# Patient Record
Sex: MALE | Race: WHITE | NOT HISPANIC OR LATINO
[De-identification: names, ages, dates, MRNs, and addresses within clinical notes are randomized per-mention and may not be internally consistent; named-entity substitution may affect disease eponyms.]

---

## 2015-10-09 RX ORDER — INSULIN GLARGINE 100 [IU]/ML
7 INJECTION, SOLUTION SUBCUTANEOUS
Qty: 0 | Refills: 0 | COMMUNITY
Start: 2015-10-09

## 2017-05-26 ENCOUNTER — MEDICATION RENEWAL (OUTPATIENT)
Age: 77
End: 2017-05-26

## 2017-05-26 RX ORDER — FENTANYL 25 UG/H
25 PATCH, EXTENDED RELEASE TRANSDERMAL
Refills: 0 | Status: ACTIVE | COMMUNITY

## 2017-05-26 RX ORDER — LATANOPROST/PF 0.005 %
0.01 DROPS OPHTHALMIC (EYE)
Qty: 1 | Refills: 3 | Status: ACTIVE | COMMUNITY

## 2017-05-26 RX ORDER — INSULIN GLARGINE 100 [IU]/ML
100 INJECTION, SOLUTION SUBCUTANEOUS
Refills: 0 | Status: ACTIVE | COMMUNITY

## 2017-05-26 RX ORDER — GLIMEPIRIDE 4 MG/1
4 TABLET ORAL DAILY
Refills: 0 | Status: ACTIVE | COMMUNITY

## 2017-05-26 RX ORDER — TIOTROPIUM BROMIDE 18 UG/1
18 CAPSULE ORAL; RESPIRATORY (INHALATION) DAILY
Qty: 90 | Refills: 3 | Status: ACTIVE | COMMUNITY

## 2017-05-26 RX ORDER — CARVEDILOL 12.5 MG/1
12.5 TABLET, FILM COATED ORAL TWICE DAILY
Qty: 60 | Refills: 4 | Status: ACTIVE | COMMUNITY

## 2017-05-26 RX ORDER — SPIRONOLACTONE 25 MG/1
25 TABLET, FILM COATED ORAL
Refills: 0 | Status: ACTIVE | COMMUNITY

## 2017-05-26 RX ORDER — FUROSEMIDE 20 MG/1
20 TABLET ORAL DAILY
Qty: 30 | Refills: 3 | Status: ACTIVE | COMMUNITY

## 2017-05-26 RX ORDER — ASPIRIN ENTERIC COATED TABLETS 81 MG 81 MG/1
81 TABLET, DELAYED RELEASE ORAL
Qty: 90 | Refills: 1 | Status: ACTIVE | COMMUNITY

## 2017-05-26 RX ORDER — PRASUGREL HYDROCHLORIDE 10 MG/1
10 TABLET, COATED ORAL
Qty: 90 | Refills: 1 | Status: ACTIVE | COMMUNITY

## 2017-05-26 RX ORDER — ATORVASTATIN CALCIUM 80 MG/1
80 TABLET, FILM COATED ORAL
Qty: 30 | Refills: 2 | Status: ACTIVE | COMMUNITY

## 2017-05-26 RX ORDER — ISOSORBIDE DINITRATE 30 MG/1
30 TABLET ORAL EVERY 6 HOURS
Refills: 0 | Status: ACTIVE | COMMUNITY

## 2017-05-26 RX ORDER — AZITHROMYCIN 500 MG/1
500 TABLET, FILM COATED ORAL
Refills: 0 | Status: ACTIVE | COMMUNITY

## 2017-05-26 RX ORDER — VALSARTAN 80 MG/1
80 TABLET, COATED ORAL
Refills: 0 | Status: ACTIVE | COMMUNITY

## 2017-05-31 ENCOUNTER — APPOINTMENT (OUTPATIENT)
Dept: ELECTROPHYSIOLOGY | Facility: CLINIC | Age: 77
End: 2017-05-31

## 2017-05-31 VITALS
HEIGHT: 71 IN | BODY MASS INDEX: 24.08 KG/M2 | DIASTOLIC BLOOD PRESSURE: 60 MMHG | WEIGHT: 172 LBS | HEART RATE: 58 BPM | SYSTOLIC BLOOD PRESSURE: 120 MMHG

## 2017-10-12 ENCOUNTER — OUTPATIENT (OUTPATIENT)
Dept: OUTPATIENT SERVICES | Facility: HOSPITAL | Age: 77
LOS: 1 days | End: 2017-10-12
Payer: MEDICARE

## 2017-10-12 VITALS
OXYGEN SATURATION: 96 % | TEMPERATURE: 98 F | HEIGHT: 71 IN | SYSTOLIC BLOOD PRESSURE: 157 MMHG | DIASTOLIC BLOOD PRESSURE: 74 MMHG | HEART RATE: 59 BPM | RESPIRATION RATE: 16 BRPM | WEIGHT: 169.98 LBS

## 2017-10-12 VITALS
DIASTOLIC BLOOD PRESSURE: 74 MMHG | HEIGHT: 71 IN | RESPIRATION RATE: 18 BRPM | TEMPERATURE: 98 F | OXYGEN SATURATION: 96 % | HEART RATE: 59 BPM | SYSTOLIC BLOOD PRESSURE: 157 MMHG | WEIGHT: 169.98 LBS

## 2017-10-12 DIAGNOSIS — C44.310 BASAL CELL CARCINOMA OF SKIN OF UNSPECIFIED PARTS OF FACE: Chronic | ICD-10-CM

## 2017-10-12 DIAGNOSIS — Z98.890 OTHER SPECIFIED POSTPROCEDURAL STATES: Chronic | ICD-10-CM

## 2017-10-12 DIAGNOSIS — Z98.89 OTHER SPECIFIED POSTPROCEDURAL STATES: Chronic | ICD-10-CM

## 2017-10-12 DIAGNOSIS — Z01.810 ENCOUNTER FOR PREPROCEDURAL CARDIOVASCULAR EXAMINATION: ICD-10-CM

## 2017-10-12 DIAGNOSIS — Z98.49 CATARACT EXTRACTION STATUS, UNSPECIFIED EYE: Chronic | ICD-10-CM

## 2017-10-12 LAB
ANION GAP SERPL CALC-SCNC: 9 MMOL/L — SIGNIFICANT CHANGE UP (ref 5–17)
BASOPHILS # BLD AUTO: 0 K/UL — SIGNIFICANT CHANGE UP (ref 0–0.2)
BASOPHILS NFR BLD AUTO: 0.1 % — SIGNIFICANT CHANGE UP (ref 0–2)
BLD GP AB SCN SERPL QL: SIGNIFICANT CHANGE UP
BUN SERPL-MCNC: 20 MG/DL — SIGNIFICANT CHANGE UP (ref 8–20)
CALCIUM SERPL-MCNC: 9.2 MG/DL — SIGNIFICANT CHANGE UP (ref 8.6–10.2)
CHLORIDE SERPL-SCNC: 99 MMOL/L — SIGNIFICANT CHANGE UP (ref 98–107)
CO2 SERPL-SCNC: 31 MMOL/L — HIGH (ref 22–29)
CREAT SERPL-MCNC: 0.83 MG/DL — SIGNIFICANT CHANGE UP (ref 0.5–1.3)
EOSINOPHIL # BLD AUTO: 0.2 K/UL — SIGNIFICANT CHANGE UP (ref 0–0.5)
EOSINOPHIL NFR BLD AUTO: 1.9 % — SIGNIFICANT CHANGE UP (ref 0–5)
GLUCOSE SERPL-MCNC: 217 MG/DL — HIGH (ref 70–115)
HCT VFR BLD CALC: 43.9 % — SIGNIFICANT CHANGE UP (ref 42–52)
HGB BLD-MCNC: 15.2 G/DL — SIGNIFICANT CHANGE UP (ref 14–18)
INR BLD: 1.03 RATIO — SIGNIFICANT CHANGE UP (ref 0.88–1.16)
LYMPHOCYTES # BLD AUTO: 1.3 K/UL — SIGNIFICANT CHANGE UP (ref 1–4.8)
LYMPHOCYTES # BLD AUTO: 10.4 % — LOW (ref 20–55)
MAGNESIUM SERPL-MCNC: 1.5 MG/DL — LOW (ref 1.6–2.6)
MCHC RBC-ENTMCNC: 32.1 PG — HIGH (ref 27–31)
MCHC RBC-ENTMCNC: 34.6 G/DL — SIGNIFICANT CHANGE UP (ref 32–36)
MCV RBC AUTO: 92.8 FL — SIGNIFICANT CHANGE UP (ref 80–94)
MONOCYTES # BLD AUTO: 1.2 K/UL — HIGH (ref 0–0.8)
MONOCYTES NFR BLD AUTO: 9.7 % — SIGNIFICANT CHANGE UP (ref 3–10)
NEUTROPHILS # BLD AUTO: 9.8 K/UL — HIGH (ref 1.8–8)
NEUTROPHILS NFR BLD AUTO: 77.5 % — HIGH (ref 37–73)
PLATELET # BLD AUTO: 178 K/UL — SIGNIFICANT CHANGE UP (ref 150–400)
POTASSIUM SERPL-MCNC: 4.1 MMOL/L — SIGNIFICANT CHANGE UP (ref 3.5–5.3)
POTASSIUM SERPL-SCNC: 4.1 MMOL/L — SIGNIFICANT CHANGE UP (ref 3.5–5.3)
PROTHROM AB SERPL-ACNC: 11.3 SEC — SIGNIFICANT CHANGE UP (ref 9.8–12.7)
RBC # BLD: 4.73 M/UL — SIGNIFICANT CHANGE UP (ref 4.6–6.2)
RBC # FLD: 13.5 % — SIGNIFICANT CHANGE UP (ref 11–15.6)
SODIUM SERPL-SCNC: 139 MMOL/L — SIGNIFICANT CHANGE UP (ref 135–145)
TYPE + AB SCN PNL BLD: SIGNIFICANT CHANGE UP
WBC # BLD: 12.6 K/UL — HIGH (ref 4.8–10.8)
WBC # FLD AUTO: 12.6 K/UL — HIGH (ref 4.8–10.8)

## 2017-10-12 PROCEDURE — 85027 COMPLETE CBC AUTOMATED: CPT

## 2017-10-12 PROCEDURE — 86901 BLOOD TYPING SEROLOGIC RH(D): CPT

## 2017-10-12 PROCEDURE — 36415 COLL VENOUS BLD VENIPUNCTURE: CPT

## 2017-10-12 PROCEDURE — 85610 PROTHROMBIN TIME: CPT

## 2017-10-12 PROCEDURE — 83735 ASSAY OF MAGNESIUM: CPT

## 2017-10-12 PROCEDURE — 86850 RBC ANTIBODY SCREEN: CPT

## 2017-10-12 PROCEDURE — 93010 ELECTROCARDIOGRAM REPORT: CPT

## 2017-10-12 PROCEDURE — 86900 BLOOD TYPING SEROLOGIC ABO: CPT

## 2017-10-12 PROCEDURE — 80048 BASIC METABOLIC PNL TOTAL CA: CPT

## 2017-10-12 PROCEDURE — G0463: CPT

## 2017-10-12 PROCEDURE — 93005 ELECTROCARDIOGRAM TRACING: CPT

## 2017-10-12 NOTE — H&P PST ADULT - PSH
Basal cell carcinoma of face  ' 2013:  s/p Excision Basal Cancer Nose with MOHS  Status post angioplasty with stent  PCI x 9, last 11/2015  Traumatic amputation of fingertip  age 12:  Right 5th Digit Basal cell carcinoma of face  ' 2013:  s/p Excision Basal Cancer Nose with MOHS  8/2017 left neck excision with skin graft  S/P carotid endarterectomy  left  S/P cataract surgery  b/l  Status post angioplasty with stent  PCI x 9, last 11/2015  Traumatic amputation of fingertip  age 12:  Right 5th Digit

## 2017-10-12 NOTE — H&P PST ADULT - PMH
Basal cell carcinoma of face  ' 2013:  Nose  CAD (coronary artery disease)  PCI x 9-last 11/2015  Carotid stenosis  Bilateral  s/p left CEA 2013  CHF (congestive heart failure)    Chronic obstructive pulmonary disease    Glaucoma    Hyperlipidemia    Hypertension    Ischemic cardiomyopathy  EF 25-30% (10/2016)  LBBB (left bundle branch block)    Traumatic amputation of fingertip  age 12:  Right 5th Finger  Type 2 diabetes mellitus    Umbilical hernia  assymptomatic Basal cell carcinoma of face  ' 2013:  Nose  CAD (coronary artery disease)  PCI x 9-last 11/2015  Carotid stenosis  Bilateral  s/p left CEA 2013  50-79% of SANDRA  CHF (congestive heart failure)    Chronic obstructive pulmonary disease    Glaucoma    Hyperlipidemia    Hypertension    Ischemic cardiomyopathy  EF 25-30% (10/2016)  LBBB (left bundle branch block)    Traumatic amputation of fingertip  age 12:  Right 5th Finger  Type 2 diabetes mellitus    Umbilical hernia  assymptomatic Basal cell carcinoma of face  ' 2013:  Nose  CAD (coronary artery disease)  PCI x 9-last 11/2015  Carotid stenosis  Bilateral  s/p left CEA 2013  50-79% of SANDRA  CHF (congestive heart failure)    Chronic obstructive pulmonary disease    Glaucoma    Hyperlipidemia    Hypertension    Ischemic cardiomyopathy  EF 25-30% (10/2016)  LBBB (left bundle branch block)    Shingles (herpes zoster) polyneuropathy  left axilla  Traumatic amputation of fingertip  age 12:  Right 5th Finger  Type 2 diabetes mellitus    Umbilical hernia  assymptomatic

## 2017-10-12 NOTE — H&P PST ADULT - HISTORY OF PRESENT ILLNESS
76 yo male with pmhx COPD, DM, carotid dz s/p left CEA 2013, HTN, CVA s/p TPA 2015, CAD s/p multiple PCIs, cardiac arrest in setting of pLAD PCI 9/2015, ICM EF 25-30% >90 days, LBBB, class II- III CHF who presents for PST for elective BIV ICD implant.    EST 9/14/17: 5 METS, duration of exercise 3min 34sec, non-diagnostic for exercise induced ischemia due to LBBB, rare PVC, negative for exercise induced angina, poor cardiovascular fitness level  Cath 11/2015: pLCX 90%, OM1 95%, OM2 80%, s/p PCI pLCX and OM1  TTE 10/2016: EF 25-30%, severe segmental LV systolic dysfunction, inflat, inf and apical wall akinesis 78 yo male with pmhx COPD, DM, carotid dz s/p left CEA 2013, HTN, CVA s/p TPA 2015, CAD s/p multiple PCIs, cardiac arrest in setting of pLAD PCI 9/2015, ICM EF 25-30% >90 days, LBBB, class II- III CHF who presents for PST for elective BIV ICD implant.    EST 9/14/17: 5 METS, duration of exercise 3min 34sec, non-diagnostic for exercise induced ischemia due to LBBB, rare PVC, negative for exercise induced angina, poor cardiovascular fitness level  Cath 11/2015-[facility]:  pLCX 90%, OM1 95%, OM2 80%, s/p PCI pLCX and OM1  TTE 10/2016: EF 25-30%, severe segmental LV systolic dysfunction, inflat, inf and apical wall akinesis

## 2017-10-12 NOTE — H&P PST ADULT - ASSESSMENT
76 yo male with ICM EF 25-30% (10/2016) despite guideline directed medical therapy for over 1 year, LBBB with QRS >150msec, Class II-III CHF, referred for elective primary prevention BIV ICD.      -hold effient 3 days prior to procedure  -hold DM medication 78 yo male with ICM EF 25-30% (10/2016) despite guideline directed medical therapy for over 1 year, LBBB with QRS >150msec, Class II-III CHF, referred for elective primary prevention BIV ICD.      -hold effient 3 days prior to procedure, last dose 10/13  -hold DM medication glimepiride morning of procedure.  -take 1/2 insulin dose night before procedure -take 7 units 10/16/17 78 yo male with ICM EF 25-30% (10/2016) despite guideline directed medical therapy for over 1 year, LBBB with QRS >150msec, Class II-III CHF, referred for elective primary prevention BIV ICD.      -hold effient 3 days prior to procedure, last dose 10/13  -hold DM medication glimepiride morning of procedure.  -take 1/2 insulin dose night before procedure -take 7 units 10/16/17  -magnesium 1.5 today, left message on home phone (Ok to do per pt) [phone] to start OTC mag Oxide 400mg po bid

## 2017-10-12 NOTE — ASU PATIENT PROFILE, ADULT - PSH
Basal cell carcinoma of face  ' 2013:  s/p Excision Basal Cancer Nose with MOHS  Status post angioplasty with stent  PCI x 9, last 11/2015  Traumatic amputation of fingertip  age 12:  Right 5th Digit

## 2017-10-17 ENCOUNTER — INPATIENT (INPATIENT)
Facility: HOSPITAL | Age: 77
LOS: 0 days | Discharge: HOME | DRG: 227 | End: 2017-10-18
Attending: STUDENT IN AN ORGANIZED HEALTH CARE EDUCATION/TRAINING PROGRAM | Admitting: STUDENT IN AN ORGANIZED HEALTH CARE EDUCATION/TRAINING PROGRAM
Payer: MEDICARE

## 2017-10-17 ENCOUNTER — TRANSCRIPTION ENCOUNTER (OUTPATIENT)
Age: 77
End: 2017-10-17

## 2017-10-17 VITALS
DIASTOLIC BLOOD PRESSURE: 72 MMHG | RESPIRATION RATE: 16 BRPM | SYSTOLIC BLOOD PRESSURE: 154 MMHG | OXYGEN SATURATION: 95 % | TEMPERATURE: 98 F | HEART RATE: 69 BPM

## 2017-10-17 DIAGNOSIS — Z98.890 OTHER SPECIFIED POSTPROCEDURAL STATES: Chronic | ICD-10-CM

## 2017-10-17 DIAGNOSIS — Z98.89 OTHER SPECIFIED POSTPROCEDURAL STATES: Chronic | ICD-10-CM

## 2017-10-17 DIAGNOSIS — Z98.49 CATARACT EXTRACTION STATUS, UNSPECIFIED EYE: Chronic | ICD-10-CM

## 2017-10-17 DIAGNOSIS — C44.310 BASAL CELL CARCINOMA OF SKIN OF UNSPECIFIED PARTS OF FACE: Chronic | ICD-10-CM

## 2017-10-17 DIAGNOSIS — I42.9 CARDIOMYOPATHY, UNSPECIFIED: ICD-10-CM

## 2017-10-17 DIAGNOSIS — Z01.810 ENCOUNTER FOR PREPROCEDURAL CARDIOVASCULAR EXAMINATION: ICD-10-CM

## 2017-10-17 LAB
BLD GP AB SCN SERPL QL: SIGNIFICANT CHANGE UP
GLUCOSE BLDC GLUCOMTR-MCNC: 329 MG/DL — HIGH (ref 70–99)
GLUCOSE BLDC GLUCOMTR-MCNC: 361 MG/DL — HIGH (ref 70–99)
TYPE + AB SCN PNL BLD: SIGNIFICANT CHANGE UP

## 2017-10-17 PROCEDURE — 71010: CPT | Mod: 26

## 2017-10-17 PROCEDURE — 33249 INSJ/RPLCMT DEFIB W/LEAD(S): CPT | Mod: Q0

## 2017-10-17 PROCEDURE — 33225 L VENTRIC PACING LEAD ADD-ON: CPT

## 2017-10-17 RX ORDER — DEXTROSE 50 % IN WATER 50 %
12.5 SYRINGE (ML) INTRAVENOUS ONCE
Qty: 0 | Refills: 0 | Status: DISCONTINUED | OUTPATIENT
Start: 2017-10-17 | End: 2017-10-18

## 2017-10-17 RX ORDER — ISOSORBIDE MONONITRATE 60 MG/1
15 TABLET, EXTENDED RELEASE ORAL DAILY
Qty: 0 | Refills: 0 | Status: DISCONTINUED | OUTPATIENT
Start: 2017-10-17 | End: 2017-10-18

## 2017-10-17 RX ORDER — CARVEDILOL PHOSPHATE 80 MG/1
12.5 CAPSULE, EXTENDED RELEASE ORAL EVERY 12 HOURS
Qty: 0 | Refills: 0 | Status: DISCONTINUED | OUTPATIENT
Start: 2017-10-17 | End: 2017-10-18

## 2017-10-17 RX ORDER — CEFAZOLIN SODIUM 1 G
2000 VIAL (EA) INJECTION EVERY 8 HOURS
Qty: 0 | Refills: 0 | Status: COMPLETED | OUTPATIENT
Start: 2017-10-17 | End: 2017-10-18

## 2017-10-17 RX ORDER — TIOTROPIUM BROMIDE 18 UG/1
1 CAPSULE ORAL; RESPIRATORY (INHALATION) DAILY
Qty: 0 | Refills: 0 | Status: DISCONTINUED | OUTPATIENT
Start: 2017-10-17 | End: 2017-10-18

## 2017-10-17 RX ORDER — DEXTROSE 50 % IN WATER 50 %
25 SYRINGE (ML) INTRAVENOUS ONCE
Qty: 0 | Refills: 0 | Status: DISCONTINUED | OUTPATIENT
Start: 2017-10-17 | End: 2017-10-18

## 2017-10-17 RX ORDER — SACUBITRIL AND VALSARTAN 24; 26 MG/1; MG/1
1 TABLET, FILM COATED ORAL
Qty: 0 | Refills: 0 | Status: DISCONTINUED | OUTPATIENT
Start: 2017-10-17 | End: 2017-10-18

## 2017-10-17 RX ORDER — OXYCODONE HYDROCHLORIDE 5 MG/1
5 TABLET ORAL EVERY 4 HOURS
Qty: 0 | Refills: 0 | Status: DISCONTINUED | OUTPATIENT
Start: 2017-10-17 | End: 2017-10-18

## 2017-10-17 RX ORDER — ATORVASTATIN CALCIUM 80 MG/1
80 TABLET, FILM COATED ORAL AT BEDTIME
Qty: 0 | Refills: 0 | Status: DISCONTINUED | OUTPATIENT
Start: 2017-10-17 | End: 2017-10-18

## 2017-10-17 RX ORDER — SODIUM CHLORIDE 9 MG/ML
1000 INJECTION, SOLUTION INTRAVENOUS
Qty: 0 | Refills: 0 | Status: DISCONTINUED | OUTPATIENT
Start: 2017-10-17 | End: 2017-10-18

## 2017-10-17 RX ORDER — FUROSEMIDE 40 MG
20 TABLET ORAL DAILY
Qty: 0 | Refills: 0 | Status: DISCONTINUED | OUTPATIENT
Start: 2017-10-17 | End: 2017-10-18

## 2017-10-17 RX ORDER — SPIRONOLACTONE 25 MG/1
25 TABLET, FILM COATED ORAL DAILY
Qty: 0 | Refills: 0 | Status: DISCONTINUED | OUTPATIENT
Start: 2017-10-17 | End: 2017-10-18

## 2017-10-17 RX ORDER — ASPIRIN/CALCIUM CARB/MAGNESIUM 324 MG
81 TABLET ORAL DAILY
Qty: 0 | Refills: 0 | Status: DISCONTINUED | OUTPATIENT
Start: 2017-10-17 | End: 2017-10-18

## 2017-10-17 RX ORDER — INSULIN GLARGINE 100 [IU]/ML
15 INJECTION, SOLUTION SUBCUTANEOUS AT BEDTIME
Qty: 0 | Refills: 0 | Status: DISCONTINUED | OUTPATIENT
Start: 2017-10-17 | End: 2017-10-18

## 2017-10-17 RX ORDER — ACETAMINOPHEN 500 MG
650 TABLET ORAL EVERY 6 HOURS
Qty: 0 | Refills: 0 | Status: DISCONTINUED | OUTPATIENT
Start: 2017-10-17 | End: 2017-10-18

## 2017-10-17 RX ORDER — DEXTROSE 50 % IN WATER 50 %
1 SYRINGE (ML) INTRAVENOUS ONCE
Qty: 0 | Refills: 0 | Status: DISCONTINUED | OUTPATIENT
Start: 2017-10-17 | End: 2017-10-18

## 2017-10-17 RX ORDER — ONDANSETRON 8 MG/1
4 TABLET, FILM COATED ORAL EVERY 6 HOURS
Qty: 0 | Refills: 0 | Status: DISCONTINUED | OUTPATIENT
Start: 2017-10-17 | End: 2017-10-18

## 2017-10-17 RX ORDER — INSULIN LISPRO 100/ML
VIAL (ML) SUBCUTANEOUS
Qty: 0 | Refills: 0 | Status: DISCONTINUED | OUTPATIENT
Start: 2017-10-17 | End: 2017-10-18

## 2017-10-17 RX ORDER — GLUCAGON INJECTION, SOLUTION 0.5 MG/.1ML
1 INJECTION, SOLUTION SUBCUTANEOUS ONCE
Qty: 0 | Refills: 0 | Status: DISCONTINUED | OUTPATIENT
Start: 2017-10-17 | End: 2017-10-18

## 2017-10-17 RX ORDER — ALPRAZOLAM 0.25 MG
0.25 TABLET ORAL EVERY 6 HOURS
Qty: 0 | Refills: 0 | Status: DISCONTINUED | OUTPATIENT
Start: 2017-10-17 | End: 2017-10-18

## 2017-10-17 RX ORDER — LATANOPROST 0.05 MG/ML
1 SOLUTION/ DROPS OPHTHALMIC; TOPICAL AT BEDTIME
Qty: 0 | Refills: 0 | Status: DISCONTINUED | OUTPATIENT
Start: 2017-10-17 | End: 2017-10-18

## 2017-10-17 RX ADMIN — OXYCODONE HYDROCHLORIDE 5 MILLIGRAM(S): 5 TABLET ORAL at 17:55

## 2017-10-17 RX ADMIN — OXYCODONE HYDROCHLORIDE 5 MILLIGRAM(S): 5 TABLET ORAL at 23:30

## 2017-10-17 RX ADMIN — Medication 100 MILLIGRAM(S): at 16:32

## 2017-10-17 RX ADMIN — LATANOPROST 1 DROP(S): 0.05 SOLUTION/ DROPS OPHTHALMIC; TOPICAL at 22:35

## 2017-10-17 RX ADMIN — INSULIN GLARGINE 15 UNIT(S): 100 INJECTION, SOLUTION SUBCUTANEOUS at 22:35

## 2017-10-17 RX ADMIN — Medication 81 MILLIGRAM(S): at 16:32

## 2017-10-17 RX ADMIN — Medication 650 MILLIGRAM(S): at 16:32

## 2017-10-17 RX ADMIN — OXYCODONE HYDROCHLORIDE 5 MILLIGRAM(S): 5 TABLET ORAL at 18:18

## 2017-10-17 RX ADMIN — OXYCODONE HYDROCHLORIDE 5 MILLIGRAM(S): 5 TABLET ORAL at 22:50

## 2017-10-17 RX ADMIN — Medication 650 MILLIGRAM(S): at 17:49

## 2017-10-17 RX ADMIN — CARVEDILOL PHOSPHATE 12.5 MILLIGRAM(S): 80 CAPSULE, EXTENDED RELEASE ORAL at 17:55

## 2017-10-17 RX ADMIN — Medication 5: at 16:29

## 2017-10-17 RX ADMIN — SACUBITRIL AND VALSARTAN 1 TABLET(S): 24; 26 TABLET, FILM COATED ORAL at 17:55

## 2017-10-17 RX ADMIN — ATORVASTATIN CALCIUM 80 MILLIGRAM(S): 80 TABLET, FILM COATED ORAL at 22:35

## 2017-10-17 NOTE — PROGRESS NOTE ADULT - SUBJECTIVE AND OBJECTIVE BOX
PROCEDURE(S): [employer] BiV ICD Implant    ELECTRPHYSIOLOGIST(S): Dell Cooper MD    COMPLICATIONS:  none          DISPOSITION:  Observation Unit           CONDITION: Stable      Pt doing well s/p MDT BiV ICD. Denies complaint    Exam:   VSS, NAD, A&O x 3  Incision: Dressing C/D/I; no bleeding, hematoma, erythema or edema  Card: S1/S2, RRR, no m/g/r  Resp: lungs CTA b/l  Abd: S/NT/ND  Ext: no edema, distal pulses intact    Assessment:   76 yo male with ICM EF 25-30% (10/2016) with Class II-III chronic systolic HFrEF despite guideline directed medical therapy for over 1 year and LBBB with QRS >150msec, now status post uncomplicated MDT BiV ICD implant.     Plan:   Port CXR now - r/o PTX or effusion, verify lead locations.   Bedrest x 4 hours post implant, then OOB w/ assist & progress as tolerated.    Continued observation on telemetry overnight.   Cont Ancef 2gm IV q 8 hours x 2 additional doses to complete 24 hour course.   Pain control with PO analgesia PRN.   NO HEPARIN OR LOVENOX, INCLUDING PROPHYLACTIC/SUBCUT DOSING, UNTIL OTHERWISE ADVISED BY EP.   Effient to resume in 2 days.   Resume other home medications.   PA/Lat CXR and device check in AM.   Pending status overnight, anticipate d/c home tomorrow with outpt f/up in 1-2 weeks.

## 2017-10-17 NOTE — DISCHARGE NOTE ADULT - HOSPITAL COURSE
78 yo male with ICM EF 25-30% (10/2016) with Class II-III chronic systolic HFrEF despite guideline directed medical therapy for over 1 year and LBBB with QRS >150msec. He presented electively 10/17/17 and underwent uncomplicated MDT BiV ICD implant for primary prevention of sudden cardiac death and cardiac resynchronization therapy. 78 yo male with ICM EF 25-30% (10/2016) with Class II-III chronic systolic HFrEF despite guideline directed medical therapy for over 1 year and LBBB with QRS >150msec. He presented electively 10/17/17 and underwent uncomplicated MDT BiV ICD implant for primary prevention of sudden cardiac death and cardiac resynchronization therapy. The patient was observed overnight without event and was discharged home the following morning with a plan for outpatient follow up.

## 2017-10-17 NOTE — DISCHARGE NOTE ADULT - PROVIDER TOKENS
FREE:[LAST:[Allison],FIRST:[Dell],PHONE:[phone]],FAX:[(   [phone]    -],ADDRESS:[[place] [address]]]

## 2017-10-17 NOTE — DISCHARGE NOTE ADULT - ADDITIONAL INSTRUCTIONS
Follow up with Dr. Cooper at [facility] in 2 weeks. Please call [phone] to schedule an appointment.

## 2017-10-17 NOTE — DISCHARGE NOTE ADULT - MEDICATION SUMMARY - MEDICATIONS TO TAKE
I will START or STAY ON the medications listed below when I get home from the hospital:    spironolactone 25 mg oral tablet  -- 1 tab(s) by mouth once a day  -- Indication: For Cardiac health    aspirin 81 mg oral delayed release tablet  -- 1 tab(s) by mouth once a day  -- Indication: For blood clot ("thrombus") prevention    acetaminophen 325 mg oral tablet  -- 2 tab(s) by mouth every 6 hours, As needed, Mild Pain (1 - 3)  -- Indication: For post procedure pain - as needed    Entresto 49 mg-51 mg oral tablet  -- 1 tab(s) by mouth 2 times a day  -- Indication: For Chronic systolic congestive heart failure    Imdur 30 mg oral tablet, extended release  -- 0.5 tab(s) by mouth once a day  -- Indication: For Cardiac health    glimepiride 4 mg oral tablet  -- 1 tab(s) by mouth once a day  -- Indication: For diabetes    insulin glargine  --   15 units qhs daily  -- Indication: For diabetes    Lipitor 80 mg oral tablet  -- 1 tab(s) by mouth once a day (at bedtime)  -- Indication: For Cholesterol management    Effient 10 mg oral tablet  -- 1 tab(s) by mouth once a day  -- Indication: For RESUME 10/20 AM    carvedilol 12.5 mg oral tablet  -- 1 tab(s) by mouth every 12 hours  -- Indication: For Cardiac health    Spiriva 18 mcg inhalation capsule  -- 1 each inhaled once a day  -- Indication: For COPD    furosemide 20 mg oral tablet  -- 1 tab(s) by mouth once a day  -- Indication: For Cardiac health    latanoprost 0.005% ophthalmic solution  --  to each affected eye   -- Indication: For glaucoma I will START or STAY ON the medications listed below when I get home from the hospital:    spironolactone 25 mg oral tablet  -- 1 tab(s) by mouth once a day  -- Indication: For Cardiac health    aspirin 81 mg oral delayed release tablet  -- 1 tab(s) by mouth once a day  -- Indication: For blood clot ("thrombus") prevention    acetaminophen 325 mg oral tablet  -- 2 tab(s) by mouth every 6 hours, As needed, Mild Pain (1 - 3)  -- Indication: For post procedure pain - as needed    Entresto 49 mg-51 mg oral tablet  -- 1 tab(s) by mouth 2 times a day  -- Indication: For Chronic systolic congestive heart failure    Imdur 30 mg oral tablet, extended release  -- 0.5 tab(s) by mouth once a day  -- Indication: For Cardiac health    glimepiride 4 mg oral tablet  -- 1 tab(s) by mouth once a day  -- Indication: For diabetes    insulin glargine  --   15 units qhs daily  -- Indication: For diabetes    Lipitor 80 mg oral tablet  -- 1 tab(s) by mouth once a day (at bedtime)  -- Indication: For Cholesterol management    Effient 10 mg oral tablet  -- 1 tab(s) by mouth once a day  -- Indication: For RESUME 10/20 AM    carvedilol 12.5 mg oral tablet  -- 1 tab(s) by mouth every 12 hours  -- Indication: For Cardiac health    Spiriva 18 mcg inhalation capsule  -- 1 each inhaled once a day  -- Indication: For COPD    furosemide 20 mg oral tablet  -- 1 tab(s) by mouth once a day  -- Indication: For Cardiac health    magnesium oxide 400 mg (240 mg elemental magnesium) oral tablet  -- 1 tab(s) by mouth once a day  -- Indication: For supplement    latanoprost 0.005% ophthalmic solution  --  to each affected eye   -- Indication: For glaucoma

## 2017-10-17 NOTE — DISCHARGE NOTE ADULT - CARE PLAN
Principal Discharge DX:	Chronic systolic congestive heart failure  Goal:	optimize cardiac health  Instructions for follow-up, activity and diet:	Cardiac Device Implant Post Operative Instructions  - Bruising around the implant site or over the chest, side or arm near the incision is normal, and will take a few weeks to resolve.  -Do not lift the affected arm higher than 90 degrees (shoulder height) in any direction for 4 weeks.   - Do not push, pull or lift anything heavier than 10 lbs (about a gallon of milk) with the affected arm for 4 weeks.     - Do not touch the incision until it is completely healed.   - There are Steristrips (white strips of tape) on your incision, which will start to peal off on their own over the next 2-3 weeks. Do not pick at or peal off the Steristrips.   - Do not apply soaps, creams, lotions, ointments or powders to the incision until it is completely healed.  You should call the doctor if:   - you notice redness, drainage, swelling, increased tenderness, hot sensation around the  incision, bleeding or incision edges pulling apart.  - your temperature is greater than 100 degress F for more than 24 hours.  - you notice swelling or bulging at the incision or around the device that was not there when you left the hospital or is increasing in size.  -you experience increased difficulty breathing.  - you notice new/worsening swelling in your legs and ankles.  - you faint or have dizzy spells.  -you have any questions or concerns regarding your device or the procedure.

## 2017-10-17 NOTE — DISCHARGE NOTE ADULT - INSTRUCTIONS
Choose lean meats and poultry without skin and prepare them without added saturated and trans fat.  Eat fish at least twice a week. Recent research shows that eating oily fish containing omega-3 fatty acids (for example, salmon, trout and herring) may help lower your risk of death from coronary artery disease.  Select fat-free, 1 percent fat and low-fat dairy products.  Cut back on foods containing partially hydrogenated vegetable oils to reduce trans fat in your diet.   To lower cholesterol, reduce saturated fat to no more than 5 to 6 percent of total calories. For someone eating 2,000 calories a day, that’s about 13 grams of saturated fat.  Cut back on beverages and foods with added sugars.  Choose and prepare foods with little or no salt. To lower blood pressure, aim to eat no more than 2,400 milligrams of sodium per day. Reducing daily intake to 1,500 mg is desirable because it can lower blood pressure even further.  If you drink alcohol, drink in moderation. That means one drink per day if you’re a woman and two drinks  per day if you’re a man.  Follow the American Heart Association recommendations when you eat out, and keep an eye on your portion sizes.  With a diagnosis of diabetes comes a strict diet regimen to help control blood sugars by watching carbohydrate consumption. Carbohydrates, such as starches, fruits, dairy and starchy vegetables, is the food group that affects glucose levels in the body. Carefully monitoring carbohydrate intake is a main component of the diabetic diet; eating three meals each day that are roughly equivalent in size can help control blood sugars. A registered dietitian can help you plan a diet customized to your individual needs.

## 2017-10-17 NOTE — DISCHARGE NOTE ADULT - PATIENT PORTAL LINK FT
“You can access the [facility] Patient Portal, offered by [facility], by registering with the following website: [email or web address]”

## 2017-10-18 VITALS
DIASTOLIC BLOOD PRESSURE: 84 MMHG | RESPIRATION RATE: 16 BRPM | OXYGEN SATURATION: 93 % | HEART RATE: 60 BPM | SYSTOLIC BLOOD PRESSURE: 150 MMHG | TEMPERATURE: 98 F

## 2017-10-18 LAB
ANION GAP SERPL CALC-SCNC: 10 MMOL/L — SIGNIFICANT CHANGE UP (ref 5–17)
BUN SERPL-MCNC: 24 MG/DL — HIGH (ref 8–20)
CALCIUM SERPL-MCNC: 8.4 MG/DL — LOW (ref 8.6–10.2)
CHLORIDE SERPL-SCNC: 97 MMOL/L — LOW (ref 98–107)
CO2 SERPL-SCNC: 30 MMOL/L — HIGH (ref 22–29)
CREAT SERPL-MCNC: 0.9 MG/DL — SIGNIFICANT CHANGE UP (ref 0.5–1.3)
GLUCOSE SERPL-MCNC: 261 MG/DL — HIGH (ref 70–115)
HCT VFR BLD CALC: 40.2 % — LOW (ref 42–52)
HGB BLD-MCNC: 13.8 G/DL — LOW (ref 14–18)
MAGNESIUM SERPL-MCNC: 1.9 MG/DL — SIGNIFICANT CHANGE UP (ref 1.6–2.6)
MCHC RBC-ENTMCNC: 31.9 PG — HIGH (ref 27–31)
MCHC RBC-ENTMCNC: 34.3 G/DL — SIGNIFICANT CHANGE UP (ref 32–36)
MCV RBC AUTO: 93.1 FL — SIGNIFICANT CHANGE UP (ref 80–94)
PLATELET # BLD AUTO: 136 K/UL — LOW (ref 150–400)
POTASSIUM SERPL-MCNC: 4.5 MMOL/L — SIGNIFICANT CHANGE UP (ref 3.5–5.3)
POTASSIUM SERPL-SCNC: 4.5 MMOL/L — SIGNIFICANT CHANGE UP (ref 3.5–5.3)
RBC # BLD: 4.32 M/UL — LOW (ref 4.6–6.2)
RBC # FLD: 13.4 % — SIGNIFICANT CHANGE UP (ref 11–15.6)
SODIUM SERPL-SCNC: 137 MMOL/L — SIGNIFICANT CHANGE UP (ref 135–145)
WBC # BLD: 14 K/UL — HIGH (ref 4.8–10.8)
WBC # FLD AUTO: 14 K/UL — HIGH (ref 4.8–10.8)

## 2017-10-18 PROCEDURE — C1887: CPT

## 2017-10-18 PROCEDURE — 93005 ELECTROCARDIOGRAM TRACING: CPT

## 2017-10-18 PROCEDURE — C1777: CPT

## 2017-10-18 PROCEDURE — C1900: CPT

## 2017-10-18 PROCEDURE — 85027 COMPLETE CBC AUTOMATED: CPT

## 2017-10-18 PROCEDURE — C1769: CPT

## 2017-10-18 PROCEDURE — C1898: CPT

## 2017-10-18 PROCEDURE — 71020: CPT | Mod: 26

## 2017-10-18 PROCEDURE — 71045 X-RAY EXAM CHEST 1 VIEW: CPT

## 2017-10-18 PROCEDURE — 83735 ASSAY OF MAGNESIUM: CPT

## 2017-10-18 PROCEDURE — 71046 X-RAY EXAM CHEST 2 VIEWS: CPT

## 2017-10-18 PROCEDURE — 93010 ELECTROCARDIOGRAM REPORT: CPT

## 2017-10-18 PROCEDURE — 82962 GLUCOSE BLOOD TEST: CPT

## 2017-10-18 PROCEDURE — 86850 RBC ANTIBODY SCREEN: CPT

## 2017-10-18 PROCEDURE — C1882: CPT

## 2017-10-18 PROCEDURE — 36415 COLL VENOUS BLD VENIPUNCTURE: CPT

## 2017-10-18 PROCEDURE — 86901 BLOOD TYPING SEROLOGIC RH(D): CPT

## 2017-10-18 PROCEDURE — C1730: CPT

## 2017-10-18 PROCEDURE — 80048 BASIC METABOLIC PNL TOTAL CA: CPT

## 2017-10-18 PROCEDURE — 86900 BLOOD TYPING SEROLOGIC ABO: CPT

## 2017-10-18 RX ORDER — MAGNESIUM OXIDE 400 MG ORAL TABLET 241.3 MG
1 TABLET ORAL
Qty: 0 | Refills: 0 | COMMUNITY
Start: 2017-10-18

## 2017-10-18 RX ORDER — ACETAMINOPHEN 500 MG
2 TABLET ORAL
Qty: 0 | Refills: 0 | COMMUNITY
Start: 2017-10-18

## 2017-10-18 RX ORDER — MAGNESIUM OXIDE 400 MG ORAL TABLET 241.3 MG
400 TABLET ORAL ONCE
Qty: 0 | Refills: 0 | Status: COMPLETED | OUTPATIENT
Start: 2017-10-18 | End: 2017-10-18

## 2017-10-18 RX ADMIN — SACUBITRIL AND VALSARTAN 1 TABLET(S): 24; 26 TABLET, FILM COATED ORAL at 06:11

## 2017-10-18 RX ADMIN — Medication 20 MILLIGRAM(S): at 06:10

## 2017-10-18 RX ADMIN — Medication 100 MILLIGRAM(S): at 00:03

## 2017-10-18 RX ADMIN — CARVEDILOL PHOSPHATE 12.5 MILLIGRAM(S): 80 CAPSULE, EXTENDED RELEASE ORAL at 06:10

## 2017-10-18 RX ADMIN — MAGNESIUM OXIDE 400 MG ORAL TABLET 400 MILLIGRAM(S): 241.3 TABLET ORAL at 09:27

## 2017-10-18 NOTE — PROGRESS NOTE ADULT - SUBJECTIVE AND OBJECTIVE BOX
Pt doing well POD #1 s/p BiV ICD implant. Stable overnight w/o event. Denies complaint.     Exam:   VSS, NAD, A&O x 3  Incision: Steri strips small amt of dried heme, but otherwise C/D/I; no bleeding, hematoma, erythema, exudate or edema; distal pulses intact  Card: S1/S2, RRR, no m/g/r  Resp: lungs CTA b/l  Abd: S/NT/ND  Ext: no edema, distal pulses intact    Device interrogation: measurements appropriate & stable. Parameters confirmed (DDD , Adaptive CRT, VF >188). No diaphragmatic stim.     Tele: intermittent atrial paced w/ BiV pacing (adaptive); no sustained arrhythmias or acute changes.     CXR: lead locations grossly stable & appropriate. Official read pending.     Labs:                         13.8   14.0  )-----------( 136      ( 18 Oct 2017 05:43 )             40.2   10-18    137  |  97<L>  |  24.0<H>  ----------------------------<  261<H>  4.5   |  30.0<H>  |  0.90    Ca    8.4<L>      18 Oct 2017 05:43  Mg     1.9     10-18      Assessment:   78 yo male with ICM EF 25-30% (10/2016) with Class II-III chronic systolic HFrEF despite guideline directed medical therapy for over 1 year and LBBB with QRS >150msec, now POD #1 status post uncomplicated MDT BiV ICD implant.       Plan:   Pt instructed as to ROM of affected arm - no lifting/pushing/pulling >10 lbs & shoulder ROM limited to 90deg & below x 4 weeks.   Pt instructed as to incision care and f/up - written instructions included in d/c documents.  Outpt f/up in 2 weeks - pt will call to schedule an appointment.    Effient to resume 10/20/17  Ok to d/c home.

## 2017-10-27 PROBLEM — I50.9 HEART FAILURE, UNSPECIFIED: Chronic | Status: ACTIVE | Noted: 2017-10-12

## 2017-10-27 PROBLEM — I44.7 LEFT BUNDLE-BRANCH BLOCK, UNSPECIFIED: Chronic | Status: ACTIVE | Noted: 2017-10-12

## 2017-10-27 PROBLEM — I25.5 ISCHEMIC CARDIOMYOPATHY: Chronic | Status: ACTIVE | Noted: 2017-10-12

## 2017-11-01 ENCOUNTER — APPOINTMENT (OUTPATIENT)
Dept: ELECTROPHYSIOLOGY | Facility: CLINIC | Age: 77
End: 2017-11-01
Payer: MEDICARE

## 2017-11-01 VITALS
HEART RATE: 60 BPM | SYSTOLIC BLOOD PRESSURE: 160 MMHG | DIASTOLIC BLOOD PRESSURE: 72 MMHG | WEIGHT: 166 LBS | BODY MASS INDEX: 23.15 KG/M2

## 2017-11-01 PROCEDURE — 93284 PRGRMG EVAL IMPLANTABLE DFB: CPT

## 2017-11-01 PROCEDURE — 93000 ELECTROCARDIOGRAM COMPLETE: CPT | Mod: 59

## 2017-11-01 PROCEDURE — 99024 POSTOP FOLLOW-UP VISIT: CPT

## 2018-06-01 ENCOUNTER — RX RENEWAL (OUTPATIENT)
Age: 78
End: 2018-06-01

## 2018-06-04 ENCOUNTER — RX RENEWAL (OUTPATIENT)
Age: 78
End: 2018-06-04

## 2018-06-10 ENCOUNTER — INPATIENT (INPATIENT)
Facility: HOSPITAL | Age: 78
LOS: 3 days | Discharge: HOME | DRG: 280 | End: 2018-06-14
Attending: HOSPITALIST | Admitting: INTERNAL MEDICINE
Payer: MEDICARE

## 2018-06-10 VITALS
HEART RATE: 70 BPM | OXYGEN SATURATION: 97 % | RESPIRATION RATE: 24 BRPM | DIASTOLIC BLOOD PRESSURE: 65 MMHG | SYSTOLIC BLOOD PRESSURE: 131 MMHG

## 2018-06-10 DIAGNOSIS — C44.310 BASAL CELL CARCINOMA OF SKIN OF UNSPECIFIED PARTS OF FACE: Chronic | ICD-10-CM

## 2018-06-10 DIAGNOSIS — I21.4 NON-ST ELEVATION (NSTEMI) MYOCARDIAL INFARCTION: ICD-10-CM

## 2018-06-10 DIAGNOSIS — Z98.890 OTHER SPECIFIED POSTPROCEDURAL STATES: Chronic | ICD-10-CM

## 2018-06-10 DIAGNOSIS — Z98.89 OTHER SPECIFIED POSTPROCEDURAL STATES: Chronic | ICD-10-CM

## 2018-06-10 DIAGNOSIS — Z98.49 CATARACT EXTRACTION STATUS, UNSPECIFIED EYE: Chronic | ICD-10-CM

## 2018-06-10 LAB
ALBUMIN SERPL ELPH-MCNC: 3.4 G/DL — SIGNIFICANT CHANGE UP (ref 3.3–5)
ALP SERPL-CCNC: 118 U/L — SIGNIFICANT CHANGE UP (ref 40–120)
ALT FLD-CCNC: 21 U/L — SIGNIFICANT CHANGE UP (ref 10–45)
ANION GAP SERPL CALC-SCNC: 14 MMOL/L — SIGNIFICANT CHANGE UP (ref 5–17)
ANION GAP SERPL CALC-SCNC: 19 MMOL/L — HIGH (ref 5–17)
APTT BLD: 31.6 SEC — SIGNIFICANT CHANGE UP (ref 27.5–37.4)
APTT BLD: 57.2 SEC — HIGH (ref 27.5–37.4)
AST SERPL-CCNC: 20 U/L — SIGNIFICANT CHANGE UP (ref 10–40)
BASOPHILS # BLD AUTO: 0 K/UL — SIGNIFICANT CHANGE UP (ref 0–0.2)
BASOPHILS NFR BLD AUTO: 0.2 % — SIGNIFICANT CHANGE UP (ref 0–2)
BILIRUB SERPL-MCNC: 0.8 MG/DL — SIGNIFICANT CHANGE UP (ref 0.2–1.2)
BLD GP AB SCN SERPL QL: NEGATIVE — SIGNIFICANT CHANGE UP
BUN SERPL-MCNC: 26 MG/DL — HIGH (ref 7–23)
BUN SERPL-MCNC: 30 MG/DL — HIGH (ref 7–23)
CALCIUM SERPL-MCNC: 8.4 MG/DL — SIGNIFICANT CHANGE UP (ref 8.4–10.5)
CALCIUM SERPL-MCNC: 8.8 MG/DL — SIGNIFICANT CHANGE UP (ref 8.4–10.5)
CHLORIDE SERPL-SCNC: 91 MMOL/L — LOW (ref 96–108)
CHLORIDE SERPL-SCNC: 94 MMOL/L — LOW (ref 96–108)
CHOLEST SERPL-MCNC: 136 MG/DL — SIGNIFICANT CHANGE UP (ref 10–199)
CK MB BLD-MCNC: 4.5 % — HIGH (ref 0–3.5)
CK MB BLD-MCNC: 5.1 % — HIGH (ref 0–3.5)
CK MB CFR SERPL CALC: 5 NG/ML — SIGNIFICANT CHANGE UP (ref 0–6.7)
CK MB CFR SERPL CALC: 7.2 NG/ML — HIGH (ref 0–6.7)
CK SERPL-CCNC: 111 U/L — SIGNIFICANT CHANGE UP (ref 30–200)
CK SERPL-CCNC: 141 U/L — SIGNIFICANT CHANGE UP (ref 30–200)
CO2 SERPL-SCNC: 26 MMOL/L — SIGNIFICANT CHANGE UP (ref 22–31)
CO2 SERPL-SCNC: 33 MMOL/L — HIGH (ref 22–31)
CREAT SERPL-MCNC: 1.04 MG/DL — SIGNIFICANT CHANGE UP (ref 0.5–1.3)
CREAT SERPL-MCNC: 1.16 MG/DL — SIGNIFICANT CHANGE UP (ref 0.5–1.3)
EOSINOPHIL # BLD AUTO: 0 K/UL — SIGNIFICANT CHANGE UP (ref 0–0.5)
EOSINOPHIL NFR BLD AUTO: 0.2 % — SIGNIFICANT CHANGE UP (ref 0–6)
GAS PNL BLDA: SIGNIFICANT CHANGE UP
GLUCOSE BLDC GLUCOMTR-MCNC: 222 MG/DL — HIGH (ref 70–99)
GLUCOSE SERPL-MCNC: 247 MG/DL — HIGH (ref 70–99)
GLUCOSE SERPL-MCNC: 300 MG/DL — HIGH (ref 70–99)
HBA1C BLD-MCNC: 6.8 % — HIGH (ref 4–5.6)
HCT VFR BLD CALC: 43.3 % — SIGNIFICANT CHANGE UP (ref 39–50)
HCT VFR BLD CALC: 45.4 % — SIGNIFICANT CHANGE UP (ref 39–50)
HDLC SERPL-MCNC: 50 MG/DL — SIGNIFICANT CHANGE UP (ref 40–125)
HGB BLD-MCNC: 14.7 G/DL — SIGNIFICANT CHANGE UP (ref 13–17)
HGB BLD-MCNC: 14.8 G/DL — SIGNIFICANT CHANGE UP (ref 13–17)
INR BLD: 1.12 RATIO — SIGNIFICANT CHANGE UP (ref 0.88–1.16)
INR BLD: 1.14 RATIO — SIGNIFICANT CHANGE UP (ref 0.88–1.16)
LACTATE BLDV-MCNC: 1.9 MMOL/L — SIGNIFICANT CHANGE UP (ref 0.7–2)
LIPID PNL WITH DIRECT LDL SERPL: 72 MG/DL — SIGNIFICANT CHANGE UP
LYMPHOCYTES # BLD AUTO: 1 K/UL — SIGNIFICANT CHANGE UP (ref 1–3.3)
LYMPHOCYTES # BLD AUTO: 5 % — LOW (ref 13–44)
MAGNESIUM SERPL-MCNC: 1.8 MG/DL — SIGNIFICANT CHANGE UP (ref 1.6–2.6)
MCHC RBC-ENTMCNC: 31.2 PG — SIGNIFICANT CHANGE UP (ref 27–34)
MCHC RBC-ENTMCNC: 32.6 GM/DL — SIGNIFICANT CHANGE UP (ref 32–36)
MCHC RBC-ENTMCNC: 32.6 PG — SIGNIFICANT CHANGE UP (ref 27–34)
MCHC RBC-ENTMCNC: 33.9 GM/DL — SIGNIFICANT CHANGE UP (ref 32–36)
MCV RBC AUTO: 95.7 FL — SIGNIFICANT CHANGE UP (ref 80–100)
MCV RBC AUTO: 96.1 FL — SIGNIFICANT CHANGE UP (ref 80–100)
MONOCYTES # BLD AUTO: 1.2 K/UL — HIGH (ref 0–0.9)
MONOCYTES NFR BLD AUTO: 5.7 % — SIGNIFICANT CHANGE UP (ref 2–14)
NEUTROPHILS # BLD AUTO: 18 K/UL — HIGH (ref 1.8–7.4)
NEUTROPHILS NFR BLD AUTO: 88.9 % — HIGH (ref 43–77)
NT-PROBNP SERPL-SCNC: HIGH PG/ML (ref 0–300)
PHOSPHATE SERPL-MCNC: 3.6 MG/DL — SIGNIFICANT CHANGE UP (ref 2.5–4.5)
PLATELET # BLD AUTO: 143 K/UL — LOW (ref 150–400)
PLATELET # BLD AUTO: 168 K/UL — SIGNIFICANT CHANGE UP (ref 150–400)
POTASSIUM SERPL-MCNC: 3.5 MMOL/L — SIGNIFICANT CHANGE UP (ref 3.5–5.3)
POTASSIUM SERPL-MCNC: 4.2 MMOL/L — SIGNIFICANT CHANGE UP (ref 3.5–5.3)
POTASSIUM SERPL-SCNC: 3.5 MMOL/L — SIGNIFICANT CHANGE UP (ref 3.5–5.3)
POTASSIUM SERPL-SCNC: 4.2 MMOL/L — SIGNIFICANT CHANGE UP (ref 3.5–5.3)
PROT SERPL-MCNC: 6.1 G/DL — SIGNIFICANT CHANGE UP (ref 6–8.3)
PROTHROM AB SERPL-ACNC: 12.2 SEC — SIGNIFICANT CHANGE UP (ref 9.8–12.7)
PROTHROM AB SERPL-ACNC: 12.5 SEC — SIGNIFICANT CHANGE UP (ref 9.8–12.7)
RBC # BLD: 4.5 M/UL — SIGNIFICANT CHANGE UP (ref 4.2–5.8)
RBC # BLD: 4.74 M/UL — SIGNIFICANT CHANGE UP (ref 4.2–5.8)
RBC # FLD: 13.4 % — SIGNIFICANT CHANGE UP (ref 10.3–14.5)
RBC # FLD: 13.4 % — SIGNIFICANT CHANGE UP (ref 10.3–14.5)
RH IG SCN BLD-IMP: POSITIVE — SIGNIFICANT CHANGE UP
SODIUM SERPL-SCNC: 136 MMOL/L — SIGNIFICANT CHANGE UP (ref 135–145)
SODIUM SERPL-SCNC: 141 MMOL/L — SIGNIFICANT CHANGE UP (ref 135–145)
TOTAL CHOLESTEROL/HDL RATIO MEASUREMENT: 2.7 RATIO — LOW (ref 3.4–9.6)
TRIGL SERPL-MCNC: 72 MG/DL — SIGNIFICANT CHANGE UP (ref 10–149)
TROPONIN T SERPL-MCNC: 0.26 NG/ML — HIGH (ref 0–0.06)
TROPONIN T SERPL-MCNC: 0.27 NG/ML — HIGH (ref 0–0.06)
TSH SERPL-MCNC: 0.76 UIU/ML — SIGNIFICANT CHANGE UP (ref 0.27–4.2)
TSH SERPL-MCNC: 0.85 UIU/ML — SIGNIFICANT CHANGE UP (ref 0.27–4.2)
WBC # BLD: 18 K/UL — HIGH (ref 3.8–10.5)
WBC # BLD: 20.2 K/UL — HIGH (ref 3.8–10.5)
WBC # FLD AUTO: 18 K/UL — HIGH (ref 3.8–10.5)
WBC # FLD AUTO: 20.2 K/UL — HIGH (ref 3.8–10.5)

## 2018-06-10 PROCEDURE — 93306 TTE W/DOPPLER COMPLETE: CPT | Mod: 26

## 2018-06-10 PROCEDURE — 93010 ELECTROCARDIOGRAM REPORT: CPT

## 2018-06-10 PROCEDURE — 71045 X-RAY EXAM CHEST 1 VIEW: CPT | Mod: 26

## 2018-06-10 PROCEDURE — 99223 1ST HOSP IP/OBS HIGH 75: CPT | Mod: GC

## 2018-06-10 RX ORDER — CARVEDILOL PHOSPHATE 80 MG/1
12.5 CAPSULE, EXTENDED RELEASE ORAL EVERY 12 HOURS
Qty: 0 | Refills: 0 | Status: DISCONTINUED | OUTPATIENT
Start: 2018-06-10 | End: 2018-06-14

## 2018-06-10 RX ORDER — FUROSEMIDE 40 MG
20 TABLET ORAL DAILY
Qty: 0 | Refills: 0 | Status: DISCONTINUED | OUTPATIENT
Start: 2018-06-10 | End: 2018-06-10

## 2018-06-10 RX ORDER — POTASSIUM CHLORIDE 20 MEQ
40 PACKET (EA) ORAL ONCE
Qty: 0 | Refills: 0 | Status: COMPLETED | OUTPATIENT
Start: 2018-06-10 | End: 2018-06-10

## 2018-06-10 RX ORDER — FUROSEMIDE 40 MG
60 TABLET ORAL ONCE
Qty: 0 | Refills: 0 | Status: COMPLETED | OUTPATIENT
Start: 2018-06-10 | End: 2018-06-10

## 2018-06-10 RX ORDER — MAGNESIUM SULFATE 500 MG/ML
2 VIAL (ML) INJECTION ONCE
Qty: 0 | Refills: 0 | Status: COMPLETED | OUTPATIENT
Start: 2018-06-10 | End: 2018-06-10

## 2018-06-10 RX ORDER — ATORVASTATIN CALCIUM 80 MG/1
80 TABLET, FILM COATED ORAL AT BEDTIME
Qty: 0 | Refills: 0 | Status: DISCONTINUED | OUTPATIENT
Start: 2018-06-10 | End: 2018-06-14

## 2018-06-10 RX ORDER — CLOPIDOGREL BISULFATE 75 MG/1
75 TABLET, FILM COATED ORAL DAILY
Qty: 0 | Refills: 0 | Status: DISCONTINUED | OUTPATIENT
Start: 2018-06-11 | End: 2018-06-14

## 2018-06-10 RX ORDER — INSULIN HUMAN 100 [IU]/ML
3 INJECTION, SOLUTION SUBCUTANEOUS ONCE
Qty: 0 | Refills: 0 | Status: COMPLETED | OUTPATIENT
Start: 2018-06-10 | End: 2018-06-10

## 2018-06-10 RX ORDER — HEPARIN SODIUM 5000 [USP'U]/ML
INJECTION INTRAVENOUS; SUBCUTANEOUS
Qty: 25000 | Refills: 0 | Status: DISCONTINUED | OUTPATIENT
Start: 2018-06-10 | End: 2018-06-12

## 2018-06-10 RX ORDER — INSULIN GLARGINE 100 [IU]/ML
7 INJECTION, SOLUTION SUBCUTANEOUS AT BEDTIME
Qty: 0 | Refills: 0 | Status: DISCONTINUED | OUTPATIENT
Start: 2018-06-10 | End: 2018-06-14

## 2018-06-10 RX ORDER — LATANOPROST 0.05 MG/ML
1 SOLUTION/ DROPS OPHTHALMIC; TOPICAL AT BEDTIME
Qty: 0 | Refills: 0 | Status: DISCONTINUED | OUTPATIENT
Start: 2018-06-10 | End: 2018-06-14

## 2018-06-10 RX ORDER — FUROSEMIDE 40 MG
40 TABLET ORAL EVERY 12 HOURS
Qty: 0 | Refills: 0 | Status: DISCONTINUED | OUTPATIENT
Start: 2018-06-10 | End: 2018-06-10

## 2018-06-10 RX ORDER — FUROSEMIDE 40 MG
20 TABLET ORAL EVERY 12 HOURS
Qty: 0 | Refills: 0 | Status: DISCONTINUED | OUTPATIENT
Start: 2018-06-10 | End: 2018-06-11

## 2018-06-10 RX ORDER — ASPIRIN/CALCIUM CARB/MAGNESIUM 324 MG
81 TABLET ORAL DAILY
Qty: 0 | Refills: 0 | Status: DISCONTINUED | OUTPATIENT
Start: 2018-06-10 | End: 2018-06-14

## 2018-06-10 RX ORDER — HEPARIN SODIUM 5000 [USP'U]/ML
4000 INJECTION INTRAVENOUS; SUBCUTANEOUS EVERY 6 HOURS
Qty: 0 | Refills: 0 | Status: DISCONTINUED | OUTPATIENT
Start: 2018-06-10 | End: 2018-06-12

## 2018-06-10 RX ORDER — INSULIN LISPRO 100/ML
VIAL (ML) SUBCUTANEOUS
Qty: 0 | Refills: 0 | Status: DISCONTINUED | OUTPATIENT
Start: 2018-06-10 | End: 2018-06-14

## 2018-06-10 RX ORDER — INSULIN LISPRO 100/ML
VIAL (ML) SUBCUTANEOUS AT BEDTIME
Qty: 0 | Refills: 0 | Status: DISCONTINUED | OUTPATIENT
Start: 2018-06-10 | End: 2018-06-12

## 2018-06-10 RX ORDER — SACUBITRIL AND VALSARTAN 24; 26 MG/1; MG/1
1 TABLET, FILM COATED ORAL
Qty: 0 | Refills: 0 | Status: DISCONTINUED | OUTPATIENT
Start: 2018-06-10 | End: 2018-06-14

## 2018-06-10 RX ADMIN — Medication 20 MILLIGRAM(S): at 21:54

## 2018-06-10 RX ADMIN — ATORVASTATIN CALCIUM 80 MILLIGRAM(S): 80 TABLET, FILM COATED ORAL at 21:54

## 2018-06-10 RX ADMIN — INSULIN GLARGINE 7 UNIT(S): 100 INJECTION, SOLUTION SUBCUTANEOUS at 22:12

## 2018-06-10 RX ADMIN — CARVEDILOL PHOSPHATE 12.5 MILLIGRAM(S): 80 CAPSULE, EXTENDED RELEASE ORAL at 17:34

## 2018-06-10 RX ADMIN — Medication 60 MILLIGRAM(S): at 14:06

## 2018-06-10 RX ADMIN — HEPARIN SODIUM 800 UNIT(S)/HR: 5000 INJECTION INTRAVENOUS; SUBCUTANEOUS at 16:04

## 2018-06-10 RX ADMIN — Medication 40 MILLIEQUIVALENT(S): at 18:21

## 2018-06-10 RX ADMIN — INSULIN HUMAN 3 UNIT(S): 100 INJECTION, SOLUTION SUBCUTANEOUS at 15:56

## 2018-06-10 RX ADMIN — LATANOPROST 1 DROP(S): 0.05 SOLUTION/ DROPS OPHTHALMIC; TOPICAL at 21:54

## 2018-06-10 RX ADMIN — SACUBITRIL AND VALSARTAN 1 TABLET(S): 24; 26 TABLET, FILM COATED ORAL at 18:55

## 2018-06-10 NOTE — H&P ADULT - PSH
Basal cell carcinoma of face  ' 2013:  s/p Excision Basal Cancer Nose with MOHS  8/2017 left neck excision with skin graft  S/P carotid endarterectomy  left  S/P cataract surgery  b/l  Status post angioplasty with stent  PCI x 9, last 11/2015  Traumatic amputation of fingertip  age 12:  Right 5th Digit

## 2018-06-10 NOTE — H&P ADULT - NSHPSOCIALHISTORY_GEN_ALL_CORE
Patient smoked for 20 years 35  years ago. Is a . Social drinker. Does not do drugs. Lives with his wife

## 2018-06-10 NOTE — H&P ADULT - PMH
Basal cell carcinoma of face  ' 2013:  Nose  CAD (coronary artery disease)  PCI x 9-last 11/2015  Carotid stenosis  Bilateral  s/p left CEA 2013  50-79% of SANDRA  CHF (congestive heart failure)    Chronic obstructive pulmonary disease    Glaucoma    Hyperlipidemia    Hypertension    Ischemic cardiomyopathy  EF 25-30% (10/2016)  LBBB (left bundle branch block)    Shingles (herpes zoster) polyneuropathy  left axilla  Traumatic amputation of fingertip  age 12:  Right 5th Finger  Type 2 diabetes mellitus    Umbilical hernia  assymptomatic

## 2018-06-10 NOTE — H&P ADULT - NSHPLABSRESULTS_GEN_ALL_CORE
(06-10 @ 13:32)                      14.8  18.0 )-----------( 143                 45.4    Neutrophils = -- (--%)  Lymphocytes = -- (--%)  Eosinophils = -- (--%)  Basophils = -- (--%)  Monocytes = -- (--%)  Bands = --%            CARDIAC MARKERS ( 10 Simone 2018 13:32 )  Trop x     /  U/L / CKMB x

## 2018-06-10 NOTE — H&P ADULT - HISTORY OF PRESENT ILLNESS
77 yo M PMH CHF (EF 25-30% in 10/16) s/p L sided BiV ICD, DM, HTN, CAD s/p 9 stents, CVA, COPD on 2L supplemental O2 at home, HLD transferred from [facility] for ACS? Per patient, yesterday morning he woke up and sat down on the couch. At rest, he became diaphoretic and was having acute difficulty breathing. Went to [facility]. Was treated for COPD exacerbation and PNA. Had a CTA that was reportedly negative and was then sent here when the T wave inversions in V4 and V5 were inverted and he had positive troponins. Per patient he was told that he had a negative FOBT.  Per family, patient has had increased THOMPSON over the last several months, requiring the oxygen that he uses on exertion to be needed more frequently. He also reports leg swelling. Denies any chest pain, cough, fevers, nausea, vomiting, diarrhea, melena.     < from: Transthoracic Echocardiogram (10.26.16 @ 09:50) >  EF (Visual Estimate): 25-30 %  ------------------------------------------------------------------------  Observations:  Mitral Valve: Thickened mitral valve. Moderate mitral  regurgitation.  Aortic Valve/Aorta: Calcified trileaflet aortic valve with  normal opening. Minimal aortic regurgitation.  Aortic Root: 3.3 cm.  Left Atrium: Normal leftatrium.  LA volume index = 22  cc/m2.  Left Ventricle: Severe segmental left ventricular systolic  dysfunction.  Inferolateral inferior and apical wall  akinesis. Diffuse hypokinesis of the remaining segments.  Left ventricular enlargement.   Eccentric left ventricular  hypertrophy (dilated left ventricle with normal relative  wall thickness). Mild diastolic dysfunction (Stage I).  Right Heart: Normal right atrium. Normal right ventricular  size and function. Normal tricuspid valve. Mild tricuspid  regurgitation. Normal pulmonic valve.  Pericardium/Pleura: Normal pericardium with no pericardial  effusion.  Hemodynamic: Estimated right atrial pressure is 8 mm Hg.  Estimated right ventricular systolic pressure equals 21 mm  Hg, assuming right atrial pressure equals 8 mm Hg,  consistent with normal pulmonary pressures.  ------------------------------------------------------------------------  Conclusions:  1. Left ventricular enlargement.   Eccentric left  ventricular hypertrophy (dilated left ventricle with normal  relative wall thickness).  2. Severe segmental left ventricular systolic dysfunction.  Inferolateral inferior and apical wall akinesis. Diffuse  hypokinesis of the remaining segments.  3. Mild diastolic dysfunction (Stage I).    < end of copied text > 79 yo M PMH CHF (EF 25-30% in 10/16) s/p L sided BiV ICD, DM, HTN, CAD s/p 9 stents, CVA, COPD on 2L supplemental O2 at home, HLD transferred from [facility] for ACS? Per patient, yesterday morning he woke up and sat down on the couch. At rest, he became diaphoretic and was having acute difficulty breathing. Went to [facility]. Was treated for COPD exacerbation and PNA. Had a CTA that was reportedly negative and was then sent here when the T wave inversions in V4 and V5 were inverted and he had positive troponins. Per patient he was told that he had a negative FOBT.  Per family, patient has had increased THOMPSON over the last several months, requiring the oxygen that he uses on exertion to be needed more frequently. He also reports leg swelling. Denies any chest pain, cough, fevers, nausea, vomiting, diarrhea, melena. 77 yo M PMH CHF (EF 25-30% in 10/16) s/p L sided BiV ICD, DM, HTN, CAD s/p 9 stents, CVA, COPD on 2L supplemental O2 at home, HLD transferred from [facility] for ACS? Per patient, yesterday morning he woke up and sat down on the couch. At rest, he became diaphoretic and was having acute difficulty breathing. Went to [facility]. Was treated for COPD exacerbation and PNA. Had a CTA that was reportedly negative and was then sent here when the T wave inversions in V4 and V5 were inverted and he had positive troponins. Per patient he was told that he had a negative FOBT.  Per family, patient has had increased THOMPSON over the last several months, requiring the oxygen that he uses on exertion to be needed more frequently. He also reports leg swelling. Denies any chest pain, cough, fevers, nausea, vomiting, diarrhea, melena.   He received methylprednisolone at [facility] as well as Lasix. Came in with jerzy.  Patient does endorse 50lb wt loss over the last year, unintentional. Attributing to lack of appetite. No night sweats. 79 yo M PMH CHF (EF 25-30% in 10/16) s/p L sided BiV ICD, DM, HTN, CAD s/p 9 stents, CVA, COPD on 2L supplemental O2 at home, HLD transferred from [facility] for ACS? Per patient, yesterday morning he woke up and sat down on the couch. At rest, he became diaphoretic and was having acute difficulty breathing. Went to [facility]. Was treated for COPD exacerbation and PNA. Had a CTA that was reportedly negative and was then sent here when the T wave inversions in V4 and V5 were inverted and he had positive troponins. Per patient he was told that he had a negative FOBT.  Per family, patient has had increased THOMPSON over the last several months, requiring the oxygen that he uses on exertion to be needed more frequently. He also reports leg swelling. Denies any chest pain, cough, fevers, nausea, vomiting, diarrhea, melena.   He received methylprednisolone at [facility] as well as Lasix. Came in with jerzy. He also received Plavix today, ASA 81mg, coreg 12.5mg, albuterol.   Patient does endorse 50lb wt loss over the last year, unintentional. Attributing to lack of appetite. No night sweats. 79 yo M PMH CHF (EF 25-30% in 10/16) s/p L sided BiV ICD, DM, HTN, CAD s/p 9 stents (Cath 11/2015-[facility]:  pLCX 90%, OM1 95%, OM2 80%, s/p PCI pLCX and OM1), CVA, COPD on 2L supplemental O2 at home, HLD transferred from [facility] for ACS? Per patient, yesterday morning he woke up and sat down on the couch. At rest, he became diaphoretic and was having acute difficulty breathing. Went to [facility]. Was treated for COPD exacerbation and PNA. Had a CTA that was reportedly negative and was then sent here when the T wave inversions in V4 and V5 were inverted and he had positive troponins. Per patient he was told that he had a negative FOBT.  Per family, patient has had increased THOMPSON over the last several months, requiring the oxygen that he uses on exertion to be needed more frequently. He also reports leg swelling. Denies any chest pain, cough, fevers, nausea, vomiting, diarrhea, melena.   He received methylprednisolone at [facility] as well as Lasix. Came in with jerzy. He also received Plavix today, ASA 81mg, coreg 12.5mg, albuterol.   Patient does endorse 50lb wt loss over the last year, unintentional. Attributing to lack of appetite. No night sweats.

## 2018-06-10 NOTE — H&P ADULT - NSHPPHYSICALEXAM_GEN_ALL_CORE
GENERAL: NAD, well-developed  HEAD:  Atraumatic, Normocephalic  EYES: EOMI, PERRLA, conjunctiva and sclera clear  NECK: Supple, No JVD  CHEST/LUNG: B/l bases with crackles on inspiration. Anteriorly, patient has fine crackles throughout. No wheezes auscultated. Patient is tachypneic and mild respiratory distress.  HEART: Regular rate and rhythm; No murmurs, rubs, or gallops  ABDOMEN: Soft, Nontender, Nondistended; Bowel sounds present  EXTREMITIES:  DP/PT pulses difficult to appreciate. +Swelling at ankles b/l.   PSYCH: AAOx3  NEUROLOGY: non-focal  SKIN: +hematomas surrounding old sticks. Thin skin

## 2018-06-10 NOTE — H&P ADULT - ASSESSMENT
77 yo M PMH CHF (EF 25-30% in 10/16) s/p L sided BiV ICD, DM, HTN, CAD s/p 9 stents, CVA, COPD on 2L supplemental O2 at home, HLD transferred from [facility] for concern of ACS, found to have pleural edema.     #Neuro:  -No active issues, AOx3    #CV:  1. ACS rule out  -Per [facility], concerned for ACS. EKG with new T wave inversions in II, III, aVF and V4 and V5 from October, 2017  -Trending Enoch.  -On cardiac monitor    2. CHF  -Patient with crackles on auscultation of lungs and pleural effusions on preliminary read of echo.  -TTE read pending  -Lasix 60mg IVP now and then will put on standing 40 IVP BID  -Strict Is and Os  -BNP elevated at [facility]- 7930    #Respiratory  1. Tachypnea  -Likely in setting of CHF exacerbation vs ACS  -BiPAP and Lasix    #GI:  -Consistent carb diet    #Endocrine:   1. DM  -On lantus 15u qhs at home  -ISS  -Consistent carbohydrate diet    #Heme:  -No active issues    #ID:  -No active issues    #DVT prophylaxis:  -heparin subq 79 yo M PMH CHF (EF 25-30% in 10/16) s/p L sided BiV ICD, DM, HTN, CAD s/p 9 stents, CVA, COPD on 2L supplemental O2 at home, HLD transferred from [facility] for concern of ACS with an elevated troponin and pulmonary edema.     #Neuro:  -No active issues, AOx3    #CV:  1. ACS rule out  -Per [facility], concerned for ACS. EKG with new T wave inversions in II, III, aVF and V4 and V5 from October, 2017  -Trending Enoch, first set elevated.  -On cardiac monitor  -Heparin     2. CHF  -Patient with crackles on auscultation of lungs and pleural effusions on preliminary read of echo.  -TTE read pending  -Lasix 60mg IVP now and then will put on standing 40 IVP BID  -Strict Is and Os  -BNP elevated at [facility]- 7930    #Respiratory  1. Tachypnea  -Likely in setting of CHF exacerbation vs ACS  -BiPAP and Lasix    #GI:  -Consistent carb diet    #Endocrine:   1. DM  -On lantus 15u qhs at home  -ISS  -Consistent carbohydrate diet    #Heme:  -No active issues    #ID:  -No active issues    #DVT prophylaxis:  -heparin subq 77 yo M PMH CHF (EF 25-30% in 10/16) s/p L sided BiV ICD, DM, HTN, CAD s/p 9 stents, CVA, COPD on 2L supplemental O2 at home, HLD transferred from [facility] for concern of ACS with an elevated troponin and pulmonary edema.     #Neuro:  -No active issues, AOx3    #CV:  1. ACS rule out  -Per [facility], concerned for ACS. EKG with new T wave inversions in II, III, aVF and V4 and V5 from October, 2017  -Trending Enoch, first set elevated.  -On cardiac monitor  -Heparin normogram. Received ASA 81mg and Plavix in AM.     2. CHF  -Patient with crackles on auscultation of lungs and pleural effusions on preliminary read of echo.  -TTE read pending  -Lasix 60mg IVP now and then will put on standing 40 IVP BID  -Strict Is and Os  -BNP elevated at [facility]- 7930, [id number] here.  -F/u CXR  -On Coreg, spirinolactone, imdur at home    3. HTN  - Continue carvedilol and valsartan,    #Respiratory  1. Tachypnea  -Likely in setting of CHF exacerbation vs ACS  -BiPAP and Lasix    #GI:  -Consistent carb diet    #Endocrine:   1. DM  -On lantus 15u qhs at home  -ISS  -Consistent carbohydrate diet    #Heme:  -No active issues    #ID:  -No active issues    #DVT prophylaxis:  -heparin subq 79 yo M PMH CHF (EF 25-30% in 10/16) s/p L sided BiV ICD, DM, HTN, CAD s/p 9 stents, CVA, COPD on 2L supplemental O2 at home, HLD transferred from [facility] for concern of NSTEMI with an elevated troponin, EKG changes from prior, and pulmonary edema.     #Neuro:  -No active issues, AOx3    #CV:  1. ACS rule out  -Per [facility], concerned for ACS. EKG with new T wave inversions in II, III, aVF and V4 and V5 from October, 2017  -Trending Enoch, first set elevated.  -On cardiac monitor  -Heparin normogram. Received ASA 81mg and Plavix in AM.     2. CHF  -Patient with crackles on auscultation of lungs and pleural effusions on preliminary read of echo.  -TTE read pending  -Lasix 60mg IVP now and then will put on standing 40 IVP BID  -Strict Is and Os  -BNP elevated at [facility]- 7930, [id number] here.  -F/u CXR  -On Coreg, spirinolactone, imdur at home    3. HTN  - Continue carvedilol and valsartan,    #Respiratory  1. Tachypnea  -Likely in setting of CHF exacerbation vs ACS  -BiPAP and Lasix    #GI:  -Consistent carb diet    #Endocrine:   1. DM  -On lantus 15u qhs at home  -ISS  -Consistent carbohydrate diet    #Heme:  -No active issues    #ID:  -No active issues    #DVT prophylaxis:  -heparin

## 2018-06-10 NOTE — H&P ADULT - NSHPREVIEWOFSYSTEMS_GEN_ALL_CORE
CONSTITUTIONAL: No fever, weight loss, or fatigue  EYES: No eye pain, visual disturbances, or discharge  ENMT:  No difficulty hearing, tinnitus, vertigo; No sinus or throat pain  NECK: No pain or stiffness  RESPIRATORY: +THOMPSON  CARDIOVASCULAR: No chest pain, palpitations, dizziness,. + leg swelling  GASTROINTESTINAL: No abdominal or epigastric pain. No nausea, vomiting, or hematemesis; No diarrhea or constipation. No melena or hematochezia.  GENITOURINARY: + bertrand in place  NEUROLOGICAL: No headaches, memory loss, loss of strength, numbness, or tremors  SKIN: No itching, burning, rashes, or lesions   MUSCULOSKELETAL: No joint pain or swelling; No muscle, back, or extremity pain

## 2018-06-11 LAB
ALBUMIN SERPL ELPH-MCNC: 3 G/DL — LOW (ref 3.3–5)
ALP SERPL-CCNC: 105 U/L — SIGNIFICANT CHANGE UP (ref 40–120)
ALT FLD-CCNC: 21 U/L — SIGNIFICANT CHANGE UP (ref 10–45)
ANION GAP SERPL CALC-SCNC: 11 MMOL/L — SIGNIFICANT CHANGE UP (ref 5–17)
APTT BLD: 59.1 SEC — HIGH (ref 27.5–37.4)
AST SERPL-CCNC: 20 U/L — SIGNIFICANT CHANGE UP (ref 10–40)
BASOPHILS # BLD AUTO: 0 K/UL — SIGNIFICANT CHANGE UP (ref 0–0.2)
BASOPHILS NFR BLD AUTO: 0 % — SIGNIFICANT CHANGE UP (ref 0–2)
BILIRUB SERPL-MCNC: 0.8 MG/DL — SIGNIFICANT CHANGE UP (ref 0.2–1.2)
BUN SERPL-MCNC: 31 MG/DL — HIGH (ref 7–23)
CALCIUM SERPL-MCNC: 8.5 MG/DL — SIGNIFICANT CHANGE UP (ref 8.4–10.5)
CHLORIDE SERPL-SCNC: 96 MMOL/L — SIGNIFICANT CHANGE UP (ref 96–108)
CK MB CFR SERPL CALC: 3.9 NG/ML — SIGNIFICANT CHANGE UP (ref 0–6.7)
CK SERPL-CCNC: 85 U/L — SIGNIFICANT CHANGE UP (ref 30–200)
CO2 SERPL-SCNC: 33 MMOL/L — HIGH (ref 22–31)
CREAT SERPL-MCNC: 0.96 MG/DL — SIGNIFICANT CHANGE UP (ref 0.5–1.3)
EOSINOPHIL # BLD AUTO: 0 K/UL — SIGNIFICANT CHANGE UP (ref 0–0.5)
EOSINOPHIL NFR BLD AUTO: 0.1 % — SIGNIFICANT CHANGE UP (ref 0–6)
GLUCOSE BLDC GLUCOMTR-MCNC: 132 MG/DL — HIGH (ref 70–99)
GLUCOSE BLDC GLUCOMTR-MCNC: 142 MG/DL — HIGH (ref 70–99)
GLUCOSE BLDC GLUCOMTR-MCNC: 173 MG/DL — HIGH (ref 70–99)
GLUCOSE BLDC GLUCOMTR-MCNC: 193 MG/DL — HIGH (ref 70–99)
GLUCOSE SERPL-MCNC: 204 MG/DL — HIGH (ref 70–99)
HCT VFR BLD CALC: 43.5 % — SIGNIFICANT CHANGE UP (ref 39–50)
HGB BLD-MCNC: 14.2 G/DL — SIGNIFICANT CHANGE UP (ref 13–17)
INR BLD: 1.08 RATIO — SIGNIFICANT CHANGE UP (ref 0.88–1.16)
LYMPHOCYTES # BLD AUTO: 1.2 K/UL — SIGNIFICANT CHANGE UP (ref 1–3.3)
LYMPHOCYTES # BLD AUTO: 6.5 % — LOW (ref 13–44)
MAGNESIUM SERPL-MCNC: 2.3 MG/DL — SIGNIFICANT CHANGE UP (ref 1.6–2.6)
MCHC RBC-ENTMCNC: 31.1 PG — SIGNIFICANT CHANGE UP (ref 27–34)
MCHC RBC-ENTMCNC: 32.6 GM/DL — SIGNIFICANT CHANGE UP (ref 32–36)
MCV RBC AUTO: 95.6 FL — SIGNIFICANT CHANGE UP (ref 80–100)
MONOCYTES # BLD AUTO: 1.5 K/UL — HIGH (ref 0–0.9)
MONOCYTES NFR BLD AUTO: 8 % — SIGNIFICANT CHANGE UP (ref 2–14)
NEUTROPHILS # BLD AUTO: 15.7 K/UL — HIGH (ref 1.8–7.4)
NEUTROPHILS NFR BLD AUTO: 85.4 % — HIGH (ref 43–77)
PHOSPHATE SERPL-MCNC: 3.4 MG/DL — SIGNIFICANT CHANGE UP (ref 2.5–4.5)
PLATELET # BLD AUTO: 156 K/UL — SIGNIFICANT CHANGE UP (ref 150–400)
POTASSIUM SERPL-MCNC: 3.2 MMOL/L — LOW (ref 3.5–5.3)
POTASSIUM SERPL-SCNC: 3.2 MMOL/L — LOW (ref 3.5–5.3)
PROT SERPL-MCNC: 5.7 G/DL — LOW (ref 6–8.3)
PROTHROM AB SERPL-ACNC: 11.8 SEC — SIGNIFICANT CHANGE UP (ref 9.8–12.7)
RBC # BLD: 4.55 M/UL — SIGNIFICANT CHANGE UP (ref 4.2–5.8)
RBC # FLD: 13.4 % — SIGNIFICANT CHANGE UP (ref 10.3–14.5)
SODIUM SERPL-SCNC: 140 MMOL/L — SIGNIFICANT CHANGE UP (ref 135–145)
TROPONIN T SERPL-MCNC: 0.22 NG/ML — HIGH (ref 0–0.06)
WBC # BLD: 18.4 K/UL — HIGH (ref 3.8–10.5)
WBC # FLD AUTO: 18.4 K/UL — HIGH (ref 3.8–10.5)

## 2018-06-11 PROCEDURE — 99233 SBSQ HOSP IP/OBS HIGH 50: CPT | Mod: GC

## 2018-06-11 PROCEDURE — 93010 ELECTROCARDIOGRAM REPORT: CPT

## 2018-06-11 RX ORDER — FUROSEMIDE 40 MG
40 TABLET ORAL DAILY
Qty: 0 | Refills: 0 | Status: DISCONTINUED | OUTPATIENT
Start: 2018-06-11 | End: 2018-06-14

## 2018-06-11 RX ORDER — TIOTROPIUM BROMIDE 18 UG/1
1 CAPSULE ORAL; RESPIRATORY (INHALATION) DAILY
Qty: 0 | Refills: 0 | Status: DISCONTINUED | OUTPATIENT
Start: 2018-06-11 | End: 2018-06-14

## 2018-06-11 RX ORDER — POTASSIUM CHLORIDE 20 MEQ
40 PACKET (EA) ORAL ONCE
Qty: 0 | Refills: 0 | Status: DISCONTINUED | OUTPATIENT
Start: 2018-06-11 | End: 2018-06-11

## 2018-06-11 RX ORDER — POTASSIUM CHLORIDE 20 MEQ
40 PACKET (EA) ORAL EVERY 4 HOURS
Qty: 0 | Refills: 0 | Status: COMPLETED | OUTPATIENT
Start: 2018-06-11 | End: 2018-06-11

## 2018-06-11 RX ADMIN — Medication 40 MILLIEQUIVALENT(S): at 08:26

## 2018-06-11 RX ADMIN — Medication 40 MILLIEQUIVALENT(S): at 12:43

## 2018-06-11 RX ADMIN — ATORVASTATIN CALCIUM 80 MILLIGRAM(S): 80 TABLET, FILM COATED ORAL at 21:05

## 2018-06-11 RX ADMIN — HEPARIN SODIUM 800 UNIT(S)/HR: 5000 INJECTION INTRAVENOUS; SUBCUTANEOUS at 00:00

## 2018-06-11 RX ADMIN — Medication 1: at 08:26

## 2018-06-11 RX ADMIN — TIOTROPIUM BROMIDE 1 CAPSULE(S): 18 CAPSULE ORAL; RESPIRATORY (INHALATION) at 17:40

## 2018-06-11 RX ADMIN — INSULIN GLARGINE 7 UNIT(S): 100 INJECTION, SOLUTION SUBCUTANEOUS at 21:22

## 2018-06-11 RX ADMIN — CLOPIDOGREL BISULFATE 75 MILLIGRAM(S): 75 TABLET, FILM COATED ORAL at 12:42

## 2018-06-11 RX ADMIN — Medication 50 GRAM(S): at 00:05

## 2018-06-11 RX ADMIN — LATANOPROST 1 DROP(S): 0.05 SOLUTION/ DROPS OPHTHALMIC; TOPICAL at 21:05

## 2018-06-11 RX ADMIN — Medication 81 MILLIGRAM(S): at 12:42

## 2018-06-11 RX ADMIN — CARVEDILOL PHOSPHATE 12.5 MILLIGRAM(S): 80 CAPSULE, EXTENDED RELEASE ORAL at 17:39

## 2018-06-11 RX ADMIN — SACUBITRIL AND VALSARTAN 1 TABLET(S): 24; 26 TABLET, FILM COATED ORAL at 17:40

## 2018-06-11 RX ADMIN — CARVEDILOL PHOSPHATE 12.5 MILLIGRAM(S): 80 CAPSULE, EXTENDED RELEASE ORAL at 05:55

## 2018-06-11 RX ADMIN — HEPARIN SODIUM 800 UNIT(S)/HR: 5000 INJECTION INTRAVENOUS; SUBCUTANEOUS at 06:38

## 2018-06-11 RX ADMIN — Medication 20 MILLIGRAM(S): at 05:55

## 2018-06-11 RX ADMIN — SACUBITRIL AND VALSARTAN 1 TABLET(S): 24; 26 TABLET, FILM COATED ORAL at 05:55

## 2018-06-11 NOTE — PROGRESS NOTE ADULT - ASSESSMENT
79 yo M PMH CHF (EF 25-30% in 10/16) s/p L sided BiV ICD, DM, HTN, CAD s/p 9 stents, CVA, COPD on 2L supplemental O2 at home, HLD transferred from [facility] for concern of NSTEMI with an elevated troponin, EKG changes from prior, and CHF exacerbation.    #Neuro:  -No active issues, AOx3    #CV:  1. NSTEMI  -EKG with new T wave inversions in II, III, aVF and V4 and V5 from October, 2017  -Enoch trending down.  -On cardiac monitor  -Heparin normogram. ASA, Plavix, Lipitor.  -Plan for cath. Could not get in contact with Dr. Jeter's, patient's cardiologist.     2. CHF  -TTE with severely reduced EF and LV dysfunction.  -Patient is net negative 2.4  -Strict Is and Os  -BNP >29926  -CXR clear, but with pleural effusions on echo noted. Crackles improving. Ankle edema improving.  -C/w Lasix 20mg IVP BID, Entresto, Coreg    3. HTN  - Continue carvedilol and entresto    #Respiratory  1. Tachypnea  -Likely in setting of CHF exacerbation & NSTEMI  -BiPAP and Lasix  -On supplemental O2    #GI:  -Consistent carb diet    #Endocrine:   1. DM  -On lantus 15u qhs at home, started on 7U qhs. Will go up for tomorrow.   -ISS  -Consistent carbohydrate diet    #Heme:  -No active issues    #ID:  -No active issues    #DVT prophylaxis:  -heparin 79 yo M PMH CHF (EF 25-30% in 10/16) s/p L sided BiV ICD, DM, HTN, CAD s/p 9 stents, CVA, COPD on 2L supplemental O2 at home, HLD transferred from [facility] for concern of NSTEMI with an elevated troponin, EKG changes from prior, and CHF exacerbation.    #Neuro:  -No active issues, AOx3    #CV:  1. NSTEMI  -EKG with new T wave inversions in II, III, aVF and V4 and V5 from October, 2017  -Enoch trending down.  -On cardiac monitor  -Heparin normogram. ASA, Plavix, Lipitor.  -Plan for cath.     2. CHF  -TTE with severely reduced EF and LV dysfunction.  -Patient is net negative 2.4  -Strict Is and Os  -BNP >87432  -CXR clear, but with pleural effusions on echo noted. Crackles improving. Ankle edema improving.  -C/w Lasix 20mg IVP BID, Entresto, Coreg    3. HTN  - Continue carvedilol and entresto    #Respiratory  1. Tachypnea  -Likely in setting of CHF exacerbation & NSTEMI  -BiPAP and Lasix  -On supplemental O2    #GI:  -Consistent carb diet    #Endocrine:   1. DM  -On lantus 15u qhs at home, started on 7U qhs. Will go up for tomorrow.   -ISS  -Consistent carbohydrate diet    #Heme:  -No active issues    #ID:  -No active issues    #DVT prophylaxis:  -heparin     #Advanced Directives:  -Pt filled out MOLST. Wants compressions and trial of intubation. Also amenable to pressors, abx, and blood products 79 yo M PMH CHF (EF 25-30% in 10/16) s/p L sided BiV ICD, DM, HTN, CAD s/p 9 stents, CVA, COPD on 2L supplemental O2 at home, HLD transferred from [facility] for concern of NSTEMI with an elevated troponin, EKG changes from prior, and CHF exacerbation.    #Neuro:  -No active issues, AOx3    #CV:  1. NSTEMI  -EKG with new T wave inversions in II, III, aVF and V4 and V5 from October, 2017  -Enoch trending down.  -On cardiac monitor  -Heparin normogram. ASA, Plavix, Lipitor.  -Plan for cath if patient decides on it.  -5/3/18 Dobutamine SPECT Myocardial Perfusion Imaging: fixed defect: medium sized, moderate inferior and a medium sized, moderate severity posterolateral and a small to medium sized, mild to mod severity apical defect. EF 29%. Regional wall motion was abnormal with a moderate sized area of moderate hypokinesis of lateral, inferior, posterior and apical walls.     2. CHF  -TTE with severely reduced EF and LV dysfunction.  -Patient is net negative 2.4  -Strict Is and Os  -BNP >77651  -CXR clear, but with pleural effusions on echo noted. Crackles improving. Ankle edema improving.  -C/w Lasix 20mg IVP BID, Entresto, Coreg    3. HTN  - Continue carvedilol and entresto    #Respiratory  1. Tachypnea  -Likely in setting of CHF exacerbation & NSTEMI  -BiPAP and Lasix  -On supplemental O2    #GI:  -Consistent carb diet    #Endocrine:   1. DM  -On lantus 15u qhs at home, started on 7U qhs. Will go up for tomorrow.   -ISS  -Consistent carbohydrate diet    #Heme:  -No active issues    #ID:  -No active issues    #DVT prophylaxis:  -heparin     #Advanced Directives:  -Pt filled out MOLST. Wants compressions and trial of intubation. Also amenable to pressors, abx, and blood products

## 2018-06-11 NOTE — PROGRESS NOTE ADULT - SUBJECTIVE AND OBJECTIVE BOX
Patient is a 78y old  Male who presents with a chief complaint of transfer from [facility] (10 Simone 2018 12:56)      Overnight events: Patient had no events overnight. Breathing comfortably on supplemental oxygen. Denies nausea, vomiting, fevers, chills, diarrhea, abdominal pain. Leg swelling has improved. Never had chest pain.    MEDICATIONS  (STANDING):  aspirin enteric coated 81 milliGRAM(s) Oral daily  atorvastatin 80 milliGRAM(s) Oral at bedtime  carvedilol 12.5 milliGRAM(s) Oral every 12 hours  clopidogrel Tablet 75 milliGRAM(s) Oral daily  furosemide   Injectable 20 milliGRAM(s) IV Push every 12 hours  heparin  Infusion.  Unit(s)/Hr (8 mL/Hr) IV Continuous <Continuous>  insulin glargine Injectable (LANTUS) 7 Unit(s) SubCutaneous at bedtime  insulin lispro (HumaLOG) corrective regimen sliding scale   SubCutaneous three times a day before meals  insulin lispro (HumaLOG) corrective regimen sliding scale   SubCutaneous at bedtime  latanoprost 0.005% Ophthalmic Solution 1 Drop(s) Both EYES at bedtime  potassium chloride    Tablet ER 40 milliEquivalent(s) Oral every 4 hours  sacubitril 49 mG/valsartan 51 mG 1 Tablet(s) Oral two times a day  tiotropium 18 MICROgram(s) Capsule 1 Capsule(s) Inhalation daily    MEDICATIONS  (PRN):  heparin  Injectable 4000 Unit(s) IV Push every 6 hours PRN For aPTT less than 40      REVIEW OF SYSTEMS:  Otherwise, 10 point ROS done and otherwise negative.    PHYSICAL EXAM:  Vital Signs Last 24 Hrs  T(C): 36.5 (11 Jun 2018 03:00), Max: 36.7 (10 Simone 2018 12:30)  T(F): 97.7 (11 Jun 2018 03:00), Max: 98 (10 Simone 2018 12:30)  HR: 68 (11 Jun 2018 06:38) (62 - 80)  BP: 143/59 (11 Jun 2018 06:38) (126/60 - 164/90)  BP(mean): 91 (11 Jun 2018 06:38) (91 - 124)  RR: 18 (11 Jun 2018 06:38) (16 - 38)  SpO2: 96% (11 Jun 2018 06:38) (94% - 98%)  I&O's Summary    10 Simone 2018 07:01  -  11 Jun 2018 07:00  --------------------------------------------------------  IN: 660 mL / OUT: 3120 mL / NET: -2460 mL        Appearance: Seen lying in bed in NAD  HEENT: Sclera clear	  Cardiovascular: RRR, normal S1, S2, no m/g/r  Respiratory: Inspiratory crackles have improved. Mildly tachypneic, without respiratory distress.   Gastrointestinal: +BS, soft, non-tender, non-distended	  Neurologic: AAOx3  Psychiatry: Appropriate mood and affect  Extremities: No LE edema  Vascular: Radial pulses palpable and equal. Dp/pt difficult to palpate.  LABS:	 	                        14.2   18.4  )-----------( 156      ( 11 Jun 2018 06:04 )             43.5     Auto Eosinophil # 0.0   / Auto Eosinophil % 0.1   / Auto Neutrophil # 15.7  / Auto Neutrophil % 85.4  / BANDS % x                            14.7   20.2  )-----------( 168      ( 10 Simone 2018 22:49 )             43.3     Auto Eosinophil # 0.0   / Auto Eosinophil % 0.2   / Auto Neutrophil # 18.0  / Auto Neutrophil % 88.9  / BANDS % x                            14.8   18.0  )-----------( 143      ( 10 Simone 2018 13:32 )             45.4     Auto Eosinophil # x     / Auto Eosinophil % x     / Auto Neutrophil # x     / Auto Neutrophil % x     / BANDS % x        INR: 1.08 ratio (06-11 @ 06:04)  INR: 1.12 ratio (06-10 @ 22:49)  INR: 1.14 ratio (06-10 @ 15:29)    06-11    140  |  96  |  31<H>  ----------------------------<  204<H>  3.2<L>   |  33<H>  |  0.96  06-10    141  |  94<L>  |  30<H>  ----------------------------<  247<H>  4.2   |  33<H>  |  1.04  06-10    136  |  91<L>  |  26<H>  ----------------------------<  300<H>  3.5   |  26  |  1.16    Ca    8.5      11 Jun 2018 06:04  Mg     2.3     06-11  Phos  3.4     06-11  TPro  5.7<L>  /  Alb  3.0<L>  /  TBili  0.8  /  DBili  x   /  AST  20  /  ALT  21  /  AlkPhos  105  06-11  TPro  6.1  /  Alb  3.4  /  TBili  0.8  /  DBili  x   /  AST  20  /  ALT  21  /  AlkPhos  118  06-10        proBNP: Serum Pro-Brain Natriuretic Peptide: 63940 pg/mL (06-10 @ 13:32)    Lipid Profile: 06-10 Chol 136 LDL 72 HDL 50 Trig 72  HgA1c: 6.8 % (06-10 @ 16:56)    TSH: Thyroid Stimulating Hormone, Serum: 0.85 uIU/mL (06-10 @ 16:59)      CARDIAC MARKERS:   11 Jun 2018 06:04 Troponin 0.22 ng/mL / Creatine Kinase 85 U/L /  CKMB 3.9 ng/mL / CPK Mass Assay % x       10 Simone 2018 22:49 Troponin 0.26 ng/mL / Creatine Kinase 111 U/L /  CKMB 5.0 ng/mL / CPK Mass Assay % 4.5 %   10 Simone 2018 13:32 Troponin 0.27 ng/mL / Creatine Kinase 141 U/L /  CKMB 7.2 ng/mL / CPK Mass Assay % 5.1 %        TELEMETRY:   ECG:   RADIOLOGY:   OTHER:     PREVIOUS DIAGNOSTIC TESTING:    [X] Echocardiogram: 6/10/18  Dimensions:    Normal Values:  LA:     4.1    2.0 - 4.0 cm  Ao:     3.3    2.0 - 3.8 cm  SEPTUM: 0.6    0.6 - 1.2 cm  PWT:    0.6    0.6 -1.1 cm  LVIDd:  6.9    3.0 - 5.6 cm  LVIDs:  6.1    1.8 - 4.0 cm  Derived variables:  LVMI: 90 g/m2  RWT: 0.17  Fractional short: 12 %  EF (Visual Estimate): 20-25 %  ------------------------------------------------------------------------  Observations:  Mitral Valve: Tethered mitral valve leaflets with normal opening. Mild mitral regurgitation.  Aortic Valve/Aorta: Calcified trileaflet aortic valve with normal opening. No aortic valve regurgitation seen.  Aortic Root: 3.3 cm.  LVOT diameter: 2.3 cm.  Left Atrium: Mildly dilated left atrium.  LA volume index =36 cc/m2.  Left Ventricle: Severe global left ventricular systolic dysfunction. Severe left ventricular enlargement. Normal diastolic function  Right Heart: A device wire is notedin the right heart. The right ventricle is not well visualized; grossly preserved right ventricular systolic function. Normal tricuspid valve. Minimal tricuspid regurgitation. Normal pulmonic  valve.  Pericardium/Pleura: Normal pericardium with trace pericardial effusion.  Right pleural effusion.  ------------------------------------------------------------------------  Conclusions:  1. Tethered mitral valve leaflets with normal opening.  2. Calcified trileaflet aortic valve with normal opening.  3. Severe left ventricular enlargement.  4. Severe global left ventricular systolic dysfunction.  5. A device wire is noted in the right heart.  6. The right ventricle is not well visualized; grossly preserved  right ventricular systolic function.    [ ] Catheterization:  [ ] Stress Test:

## 2018-06-11 NOTE — CHART NOTE - NSCHARTNOTEFT_GEN_A_CORE
====================  CCU MIDNIGHT ROUNDS  ====================    BRANDIE DEVINE  [id number]  Patient is a 78y old  Male who presents with a chief complaint of transfer from [facility] (10 Simone 2018 12:56)      ====================  SUMMARY:  ====================    HPI:  79 yo M PMH CHF (EF 25-30% in 10/16) s/p L sided BiV ICD, DM, HTN, CAD s/p 9 stents (Cath 11/2015-[facility]:  pLCX 90%, OM1 95%, OM2 80%, s/p PCI pLCX and OM1), CVA, COPD on 2L supplemental O2 at home, HLD transferred from [facility] for ACS? Per patient, yesterday morning he woke up and sat down on the couch. At rest, he became diaphoretic and was having acute difficulty breathing. Went to [facility]. Was treated for COPD exacerbation and PNA. Had a CTA that was reportedly negative and was then sent here when the T wave inversions in V4 and V5 were inverted and he had positive troponins. Per patient he was told that he had a negative FOBT.  Per family, patient has had increased THOMPSON over the last several months, requiring the oxygen that he uses on exertion to be needed more frequently. He also reports leg swelling. Denies any chest pain, cough, fevers, nausea, vomiting, diarrhea, melena.   He received methylprednisolone at [facility] as well as Lasix. Came in with jerzy. He also received Plavix today, ASA 81mg, coreg 12.5mg, albuterol.   Patient does endorse 50lb wt loss over the last year, unintentional. Attributing to lack of appetite. No night sweats. (10 Simone 2018 12:56)      ====================  NEW EVENTS:  ====================    O/N patient c/o of multiple bouts of diarrhea (watery in appearance). Send for stool cx, ova and parasite, C. diff.     ====================  VITALS (Last 12 hrs):  ====================    T(C): 36.7 (06-11-18 @ 16:00), Max: 36.7 (06-11-18 @ 16:00)  T(F): 98.1 (06-11-18 @ 16:00), Max: 98.1 (06-11-18 @ 16:00)  HR: 62 (06-11-18 @ 21:00) (60 - 68)  BP: 128/74 (06-11-18 @ 21:00) (128/74 - 156/81)  BP(mean): 104 (06-11-18 @ 21:00) (91 - 121)  ABP: --  ABP(mean): --  RR: 23 (06-11-18 @ 21:00) (20 - 35)  SpO2: 94% (06-11-18 @ 21:00) (90% - 96%)  Wt(kg): --  CVP(mm Hg): --  CVP(cm H2O): --  CO: --  CI: --  PA: --  PA(mean): --  PCWP: --  SVR: --  PVR: --    I&O's Summary    10 Simone 2018 07:01  -  11 Jun 2018 07:00  --------------------------------------------------------  IN: 660 mL / OUT: 3120 mL / NET: -2460 mL    11 Jun 2018 07:01  -  11 Jun 2018 22:02  --------------------------------------------------------  IN: 912 mL / OUT: 1955 mL / NET: -1043 mL            ====================  NEW LABS:  ====================                          14.2   18.4  )-----------( 156      ( 11 Jun 2018 06:04 )             43.5     06-11    140  |  96  |  31<H>  ----------------------------<  204<H>  3.2<L>   |  33<H>  |  0.96    Ca    8.5      11 Jun 2018 06:04  Phos  3.4     06-11  Mg     2.3     06-11    TPro  5.7<L>  /  Alb  3.0<L>  /  TBili  0.8  /  DBili  x   /  AST  20  /  ALT  21  /  AlkPhos  105  06-11    PT/INR - ( 11 Jun 2018 06:04 )   PT: 11.8 sec;   INR: 1.08 ratio         PTT - ( 11 Jun 2018 06:04 )  PTT:59.1 sec  Creatine Kinase, Serum: 85 U/L (06-11-18 @ 06:04)  Troponin T, Serum: 0.22 ng/mL <H> (06-11-18 @ 06:04)  Creatine Kinase, Serum: 111 U/L (06-10-18 @ 22:49)  Troponin T, Serum: 0.26 ng/mL <H> (06-10-18 @ 22:49)    CKMB Units: 3.9 ng/mL (06-11 @ 06:04)  CKMB Units: 5.0 ng/mL (06-10 @ 22:49)    ABG - ( 10 Simone 2018 15:31 )  pH, Arterial: 7.48  pH, Blood: x     /  pCO2: 43    /  pO2: 104   / HCO3: 32    / Base Excess: 7.5   /  SaO2: 98                    ====================  PLAN:  ====================  79 yo M PMH CHF (EF 25-30% in 10/16) s/p L sided BiV ICD, DM, HTN, CAD s/p 9 stents, CVA, COPD on 2L supplemental O2 at home, HLD transferred from [facility] for concern of NSTEMI with an elevated troponin, EKG changes from prior, and CHF exacerbation.    #Neuro:  -No active issues, AOx3    #CV:  1. NSTEMI  - EKG with new T wave inversions in II, III, aVF and V4 and V5 from October, 2017  - Enoch trending down.  - On cardiac monitor  - Heparin normogram. ASA, Plavix, Lipitor.  - Patient unsure about cath, given hx of CVA on the previous cath  -5/3/18 Dobutamine SPECT Myocardial Perfusion Imaging: fixed defect: medium sized, moderate inferior and a medium sized, moderate severity posterolateral and a small to medium sized, mild to mod severity apical defect. EF 29%. Regional wall motion was abnormal with a moderate sized area of moderate hypokinesis of lateral, inferior, posterior and apical walls.     2. CHF  -TTE with severely reduced EF and LV dysfunction.  -Strict Is and Os  -BNP >51855  -CXR clear, but with pleural effusions on echo noted. Crackles improving. Ankle edema improving.  - PO lasix 40mg once a day     3. HTN  - Continue carvedilol and entresto    #Respiratory  1. Tachypnea  -Likely in setting of CHF exacerbation & NSTEMI  -BiPAP and Lasix  -On supplemental O2    #GI:  -Consistent carb diet    #Endocrine:   1. DM  -On lantus 15u qhs at home, started on 7U qhs. Will go up for tomorrow.   -ISS  -Consistent carbohydrate diet    #Heme:  -No active issues    #ID:  -No active issues    #DVT prophylaxis:  -heparin     #Advanced Directives:  -Pt filled out MOLST. Wants compressions and trial of intubation. Also amenable to pressors, abx, and blood products        Deejay Dean MD PGY2   [facility] Pager #: [phone]  [facility] Pager #: [phone]

## 2018-06-11 NOTE — CHART NOTE - NSCHARTNOTEFT_GEN_A_CORE
====================  CCU MIDNIGHT ROUNDS  ====================    BRANDIE DEVINE  [id number]  Patient is a 78y old  Male who presents with a chief complaint of transfer from [facility] (10 Simone 2018 12:56)    ====================  SUMMARY:  ====================  79 yo M PMH CHF (EF 25-30% in 10/16) s/p L sided BiV ICD, DM, HTN, CAD s/p 9 stents (Cath 11/2015-[facility]:  pLCX 90%, OM1 95%, OM2 80%, s/p PCI pLCX and OM1), CVA, COPD on 2L supplemental O2 at home, HLD transferred from [facility] for NSTEMI.  In [facility], patient was given steroid and Lasix fro difficulty breathing.  Loaded with ASA, Plavix, and Heparin and transferred to [facility] for further management.    ====================  NEW EVENTS:  ====================    MEDICATIONS  (STANDING):  aspirin enteric coated 81 milliGRAM(s) Oral daily  atorvastatin 80 milliGRAM(s) Oral at bedtime  carvedilol 12.5 milliGRAM(s) Oral every 12 hours  clopidogrel Tablet 75 milliGRAM(s) Oral daily  furosemide   Injectable 20 milliGRAM(s) IV Push every 12 hours  heparin  Infusion.  Unit(s)/Hr (8 mL/Hr) IV Continuous <Continuous>  insulin glargine Injectable (LANTUS) 7 Unit(s) SubCutaneous at bedtime  insulin lispro (HumaLOG) corrective regimen sliding scale   SubCutaneous three times a day before meals  insulin lispro (HumaLOG) corrective regimen sliding scale   SubCutaneous at bedtime  latanoprost 0.005% Ophthalmic Solution 1 Drop(s) Both EYES at bedtime  sacubitril 49 mG/valsartan 51 mG 1 Tablet(s) Oral two times a day    MEDICATIONS  (PRN):  heparin  Injectable 4000 Unit(s) IV Push every 6 hours PRN For aPTT less than 40    ====================  VITALS (Last 12 hrs):  ====================    T(C): 36.5 (06-10-18 @ 23:00), Max: 36.7 (06-10-18 @ 12:30)  T(F): 97.7 (06-10-18 @ 23:00), Max: 98 (06-10-18 @ 12:30)  HR: 68 (06-10-18 @ 23:00) (64 - 80)  BP: 145/69 (06-10-18 @ 23:00) (131/65 - 164/90)  BP(mean): 104 (06-10-18 @ 23:00) (94 - 124)  ABP: --  ABP(mean): --  RR: 22 (06-10-18 @ 23:00) (16 - 38)  SpO2: 97% (06-10-18 @ 23:00) (95% - 98%)  Wt(kg): --  CVP(mm Hg): --  CVP(cm H2O): --  CO: --  CI: --  PA: --  PA(mean): --  PCWP: --  SVR: --  PVR: --    I&O's Summary    10 Simone 2018 07:01  -  11 Jun 2018 00:12  --------------------------------------------------------  IN: 434 mL / OUT: 2550 mL / NET: -2116 mL    ====================  NEW LABS:  ====================    06-10    141  |  94<L>  |  30<H>  ----------------------------<  247<H>  4.2   |  33<H>  |  1.04    Ca    8.4      10 Simone 2018 22:49  Phos  3.6     06-10  Mg     1.8     06-10    TPro  6.1  /  Alb  3.4  /  TBili  0.8  /  DBili  x   /  AST  20  /  ALT  21  /  AlkPhos  118  06-10    PT/INR - ( 10 Simone 2018 22:49 )   PT: 12.2 sec;   INR: 1.12 ratio      PTT - ( 10 Simone 2018 22:49 )  PTT:57.2 sec  Creatine Kinase, Serum: 111 U/L (06-10-18 @ 22:49)  Troponin T, Serum: 0.26 ng/mL <H> (06-10-18 @ 22:49)  Creatine Kinase, Serum: 141 U/L (06-10-18 @ 13:32)  Troponin T, Serum: 0.27 ng/mL <H> (06-10-18 @ 13:32)    CKMB Units: 5.0 ng/mL (06-10 @ 22:49)  CKMB Units: 7.2 ng/mL (06-10 @ 13:32)    ABG - ( 10 Simone 2018 15:31 )  pH, Arterial: 7.48  pH, Blood: x     /  pCO2: 43    /  pO2: 104   / HCO3: 32    / Base Excess: 7.5   /  SaO2: 98        ====================  PLAN:  ====================  - 79 y/o male with ICM, EF 25-30% s/p BiVICD, DM2, HTN, CAD s/p multiple stents, HLD, CVA, and COPD on home O2 several hours/day presented with NSTEMI and pulmonary edema  - Given Lasix 60 mg IV x 1 in addition to 40 mg given PTA.  Janay Rouse CCU NP  Beeper #[id number]  Spectra # [phone] ====================  CCU MIDNIGHT ROUNDS  ====================    BRANDIE DEVINE  [id number]  Patient is a 78y old  Male who presents with a chief complaint of transfer from [facility] (10 Simone 2018 12:56)    ====================  SUMMARY:  ====================  77 yo M PMH CHF (EF 25-30% in 10/16) s/p L sided BiV ICD, DM, HTN, CAD s/p 9 stents (Cath 11/2015-[facility]:  pLCX 90%, OM1 95%, OM2 80%, s/p PCI pLCX and OM1), CVA, COPD on 2L supplemental O2 at home, HLD transferred from [facility] for NSTEMI.  In [facility], patient was given steroid and Lasix fro difficulty breathing.  Loaded with ASA, Plavix, and Heparin and transferred to [facility] for further management.    ====================  NEW EVENTS:  ====================    MEDICATIONS  (STANDING):  aspirin enteric coated 81 milliGRAM(s) Oral daily  atorvastatin 80 milliGRAM(s) Oral at bedtime  carvedilol 12.5 milliGRAM(s) Oral every 12 hours  clopidogrel Tablet 75 milliGRAM(s) Oral daily  furosemide   Injectable 20 milliGRAM(s) IV Push every 12 hours  heparin  Infusion.  Unit(s)/Hr (8 mL/Hr) IV Continuous <Continuous>  insulin glargine Injectable (LANTUS) 7 Unit(s) SubCutaneous at bedtime  insulin lispro (HumaLOG) corrective regimen sliding scale   SubCutaneous three times a day before meals  insulin lispro (HumaLOG) corrective regimen sliding scale   SubCutaneous at bedtime  latanoprost 0.005% Ophthalmic Solution 1 Drop(s) Both EYES at bedtime  sacubitril 49 mG/valsartan 51 mG 1 Tablet(s) Oral two times a day    MEDICATIONS  (PRN):  heparin  Injectable 4000 Unit(s) IV Push every 6 hours PRN For aPTT less than 40    ====================  VITALS (Last 12 hrs):  ====================    T(C): 36.5 (06-10-18 @ 23:00), Max: 36.7 (06-10-18 @ 12:30)  T(F): 97.7 (06-10-18 @ 23:00), Max: 98 (06-10-18 @ 12:30)  HR: 68 (06-10-18 @ 23:00) (64 - 80)  BP: 145/69 (06-10-18 @ 23:00) (131/65 - 164/90)  BP(mean): 104 (06-10-18 @ 23:00) (94 - 124)  ABP: --  ABP(mean): --  RR: 22 (06-10-18 @ 23:00) (16 - 38)  SpO2: 97% (06-10-18 @ 23:00) (95% - 98%)  Wt(kg): --  CVP(mm Hg): --  CVP(cm H2O): --  CO: --  CI: --  PA: --  PA(mean): --  PCWP: --  SVR: --  PVR: --    I&O's Summary    10 Smione 2018 07:01  -  11 Jun 2018 00:12  --------------------------------------------------------  IN: 434 mL / OUT: 2550 mL / NET: -2116 mL    ====================  NEW LABS:  ====================    06-10    141  |  94<L>  |  30<H>  ----------------------------<  247<H>  4.2   |  33<H>  |  1.04    Ca    8.4      10 Simone 2018 22:49  Phos  3.6     06-10  Mg     1.8     06-10    TPro  6.1  /  Alb  3.4  /  TBili  0.8  /  DBili  x   /  AST  20  /  ALT  21  /  AlkPhos  118  06-10    PT/INR - ( 10 Simone 2018 22:49 )   PT: 12.2 sec;   INR: 1.12 ratio      PTT - ( 10 Simone 2018 22:49 )  PTT:57.2 sec  Creatine Kinase, Serum: 111 U/L (06-10-18 @ 22:49)  Troponin T, Serum: 0.26 ng/mL <H> (06-10-18 @ 22:49)  Creatine Kinase, Serum: 141 U/L (06-10-18 @ 13:32)  Troponin T, Serum: 0.27 ng/mL <H> (06-10-18 @ 13:32)    CKMB Units: 5.0 ng/mL (06-10 @ 22:49)  CKMB Units: 7.2 ng/mL (06-10 @ 13:32)    ABG - ( 10 Simone 2018 15:31 )  pH, Arterial: 7.48  pH, Blood: x     /  pCO2: 43    /  pO2: 104   / HCO3: 32    / Base Excess: 7.5   /  SaO2: 98        ====================  PLAN:  ====================  - 77 y/o male with ICM, EF 25-30% s/p BiVICD, DM2, HTN, CAD s/p multiple stents, HLD, CVA, and COPD on home O2 several hours/day presented with NSTEMI and pulmonary edema  - Given Lasix 60 mg IV x 1 in addition to 40 mg given PTA.  Diuresed and now net negative 2.2 L.  Monitor electrolytes and replete to keep K>4 and Mag>2.  Strict I 7 O's  - BIPAP prn  - Continue Heparin drip ACS protocol  - Continue DAPT, BB, and statin  - Continue Entersto  - Trend cardiac enzymes  - Cardiac cath in am  - TTE showed EF 20-25%    Yen Rouse CCU NP  Beeper #[id number]  Spectra # [phone]

## 2018-06-12 LAB
ALBUMIN SERPL ELPH-MCNC: 2.7 G/DL — LOW (ref 3.3–5)
ALP SERPL-CCNC: 98 U/L — SIGNIFICANT CHANGE UP (ref 40–120)
ALT FLD-CCNC: 21 U/L — SIGNIFICANT CHANGE UP (ref 10–45)
ANION GAP SERPL CALC-SCNC: 10 MMOL/L — SIGNIFICANT CHANGE UP (ref 5–17)
ANION GAP SERPL CALC-SCNC: 8 MMOL/L — SIGNIFICANT CHANGE UP (ref 5–17)
APTT BLD: 61.7 SEC — HIGH (ref 27.5–37.4)
AST SERPL-CCNC: 20 U/L — SIGNIFICANT CHANGE UP (ref 10–40)
BASOPHILS # BLD AUTO: 0 K/UL — SIGNIFICANT CHANGE UP (ref 0–0.2)
BASOPHILS NFR BLD AUTO: 0.1 % — SIGNIFICANT CHANGE UP (ref 0–2)
BILIRUB SERPL-MCNC: 0.7 MG/DL — SIGNIFICANT CHANGE UP (ref 0.2–1.2)
BUN SERPL-MCNC: 26 MG/DL — HIGH (ref 7–23)
BUN SERPL-MCNC: 27 MG/DL — HIGH (ref 7–23)
C DIFF GDH STL QL: NEGATIVE — SIGNIFICANT CHANGE UP
C DIFF GDH STL QL: SIGNIFICANT CHANGE UP
CALCIUM SERPL-MCNC: 8.1 MG/DL — LOW (ref 8.4–10.5)
CALCIUM SERPL-MCNC: 8.4 MG/DL — SIGNIFICANT CHANGE UP (ref 8.4–10.5)
CHLORIDE SERPL-SCNC: 101 MMOL/L — SIGNIFICANT CHANGE UP (ref 96–108)
CHLORIDE SERPL-SCNC: 102 MMOL/L — SIGNIFICANT CHANGE UP (ref 96–108)
CO2 SERPL-SCNC: 31 MMOL/L — SIGNIFICANT CHANGE UP (ref 22–31)
CO2 SERPL-SCNC: 33 MMOL/L — HIGH (ref 22–31)
CREAT SERPL-MCNC: 0.89 MG/DL — SIGNIFICANT CHANGE UP (ref 0.5–1.3)
CREAT SERPL-MCNC: 0.92 MG/DL — SIGNIFICANT CHANGE UP (ref 0.5–1.3)
CULTURE RESULTS: SIGNIFICANT CHANGE UP
EOSINOPHIL # BLD AUTO: 0.3 K/UL — SIGNIFICANT CHANGE UP (ref 0–0.5)
EOSINOPHIL NFR BLD AUTO: 2.3 % — SIGNIFICANT CHANGE UP (ref 0–6)
GLUCOSE BLDC GLUCOMTR-MCNC: 120 MG/DL — HIGH (ref 70–99)
GLUCOSE BLDC GLUCOMTR-MCNC: 166 MG/DL — HIGH (ref 70–99)
GLUCOSE BLDC GLUCOMTR-MCNC: 176 MG/DL — HIGH (ref 70–99)
GLUCOSE BLDC GLUCOMTR-MCNC: 191 MG/DL — HIGH (ref 70–99)
GLUCOSE SERPL-MCNC: 126 MG/DL — HIGH (ref 70–99)
GLUCOSE SERPL-MCNC: 132 MG/DL — HIGH (ref 70–99)
HCT VFR BLD CALC: 45.5 % — SIGNIFICANT CHANGE UP (ref 39–50)
HGB BLD-MCNC: 15 G/DL — SIGNIFICANT CHANGE UP (ref 13–17)
INR BLD: 1.07 RATIO — SIGNIFICANT CHANGE UP (ref 0.88–1.16)
LYMPHOCYTES # BLD AUTO: 1.8 K/UL — SIGNIFICANT CHANGE UP (ref 1–3.3)
LYMPHOCYTES # BLD AUTO: 12.6 % — LOW (ref 13–44)
MAGNESIUM SERPL-MCNC: 1.9 MG/DL — SIGNIFICANT CHANGE UP (ref 1.6–2.6)
MAGNESIUM SERPL-MCNC: 2 MG/DL — SIGNIFICANT CHANGE UP (ref 1.6–2.6)
MCHC RBC-ENTMCNC: 31.8 PG — SIGNIFICANT CHANGE UP (ref 27–34)
MCHC RBC-ENTMCNC: 32.9 GM/DL — SIGNIFICANT CHANGE UP (ref 32–36)
MCV RBC AUTO: 96.8 FL — SIGNIFICANT CHANGE UP (ref 80–100)
MONOCYTES # BLD AUTO: 1.3 K/UL — HIGH (ref 0–0.9)
MONOCYTES NFR BLD AUTO: 9.5 % — SIGNIFICANT CHANGE UP (ref 2–14)
NEUTROPHILS # BLD AUTO: 10.5 K/UL — HIGH (ref 1.8–7.4)
NEUTROPHILS NFR BLD AUTO: 75.5 % — SIGNIFICANT CHANGE UP (ref 43–77)
PHOSPHATE SERPL-MCNC: 2.6 MG/DL — SIGNIFICANT CHANGE UP (ref 2.5–4.5)
PHOSPHATE SERPL-MCNC: 2.7 MG/DL — SIGNIFICANT CHANGE UP (ref 2.5–4.5)
PLATELET # BLD AUTO: 148 K/UL — LOW (ref 150–400)
POTASSIUM SERPL-MCNC: 3.2 MMOL/L — LOW (ref 3.5–5.3)
POTASSIUM SERPL-MCNC: 3.8 MMOL/L — SIGNIFICANT CHANGE UP (ref 3.5–5.3)
POTASSIUM SERPL-SCNC: 3.2 MMOL/L — LOW (ref 3.5–5.3)
POTASSIUM SERPL-SCNC: 3.8 MMOL/L — SIGNIFICANT CHANGE UP (ref 3.5–5.3)
PROT SERPL-MCNC: 5.2 G/DL — LOW (ref 6–8.3)
PROTHROM AB SERPL-ACNC: 11.6 SEC — SIGNIFICANT CHANGE UP (ref 9.8–12.7)
RBC # BLD: 4.7 M/UL — SIGNIFICANT CHANGE UP (ref 4.2–5.8)
RBC # FLD: 13.3 % — SIGNIFICANT CHANGE UP (ref 10.3–14.5)
SODIUM SERPL-SCNC: 142 MMOL/L — SIGNIFICANT CHANGE UP (ref 135–145)
SODIUM SERPL-SCNC: 143 MMOL/L — SIGNIFICANT CHANGE UP (ref 135–145)
SPECIMEN SOURCE: SIGNIFICANT CHANGE UP
WBC # BLD: 13.9 K/UL — HIGH (ref 3.8–10.5)
WBC # FLD AUTO: 13.9 K/UL — HIGH (ref 3.8–10.5)

## 2018-06-12 PROCEDURE — 93010 ELECTROCARDIOGRAM REPORT: CPT

## 2018-06-12 PROCEDURE — 93458 L HRT ARTERY/VENTRICLE ANGIO: CPT | Mod: 26

## 2018-06-12 PROCEDURE — 99233 SBSQ HOSP IP/OBS HIGH 50: CPT

## 2018-06-12 PROCEDURE — 93010 ELECTROCARDIOGRAM REPORT: CPT | Mod: 77

## 2018-06-12 PROCEDURE — 99152 MOD SED SAME PHYS/QHP 5/>YRS: CPT

## 2018-06-12 RX ORDER — INSULIN LISPRO 100/ML
VIAL (ML) SUBCUTANEOUS AT BEDTIME
Qty: 0 | Refills: 0 | Status: DISCONTINUED | OUTPATIENT
Start: 2018-06-12 | End: 2018-06-14

## 2018-06-12 RX ORDER — LOPERAMIDE HCL 2 MG
2 TABLET ORAL
Qty: 0 | Refills: 0 | Status: DISCONTINUED | OUTPATIENT
Start: 2018-06-12 | End: 2018-06-14

## 2018-06-12 RX ORDER — SODIUM,POTASSIUM PHOSPHATES 278-250MG
1 POWDER IN PACKET (EA) ORAL ONCE
Qty: 0 | Refills: 0 | Status: DISCONTINUED | OUTPATIENT
Start: 2018-06-12 | End: 2018-06-12

## 2018-06-12 RX ORDER — POTASSIUM CHLORIDE 20 MEQ
40 PACKET (EA) ORAL EVERY 4 HOURS
Qty: 0 | Refills: 0 | Status: COMPLETED | OUTPATIENT
Start: 2018-06-12 | End: 2018-06-12

## 2018-06-12 RX ORDER — POTASSIUM CHLORIDE 20 MEQ
40 PACKET (EA) ORAL ONCE
Qty: 0 | Refills: 0 | Status: COMPLETED | OUTPATIENT
Start: 2018-06-12 | End: 2018-06-12

## 2018-06-12 RX ORDER — MAGNESIUM SULFATE 500 MG/ML
1 VIAL (ML) INJECTION ONCE
Qty: 0 | Refills: 0 | Status: COMPLETED | OUTPATIENT
Start: 2018-06-12 | End: 2018-06-12

## 2018-06-12 RX ORDER — LACTOBACILLUS ACIDOPHILUS 100MM CELL
1 CAPSULE ORAL DAILY
Qty: 0 | Refills: 0 | Status: DISCONTINUED | OUTPATIENT
Start: 2018-06-12 | End: 2018-06-14

## 2018-06-12 RX ADMIN — SACUBITRIL AND VALSARTAN 1 TABLET(S): 24; 26 TABLET, FILM COATED ORAL at 05:08

## 2018-06-12 RX ADMIN — LATANOPROST 1 DROP(S): 0.05 SOLUTION/ DROPS OPHTHALMIC; TOPICAL at 21:18

## 2018-06-12 RX ADMIN — Medication 40 MILLIEQUIVALENT(S): at 16:01

## 2018-06-12 RX ADMIN — Medication 40 MILLIGRAM(S): at 05:08

## 2018-06-12 RX ADMIN — TIOTROPIUM BROMIDE 1 CAPSULE(S): 18 CAPSULE ORAL; RESPIRATORY (INHALATION) at 17:20

## 2018-06-12 RX ADMIN — ATORVASTATIN CALCIUM 80 MILLIGRAM(S): 80 TABLET, FILM COATED ORAL at 21:16

## 2018-06-12 RX ADMIN — Medication 40 MILLIEQUIVALENT(S): at 05:27

## 2018-06-12 RX ADMIN — Medication 2 MILLIGRAM(S): at 09:35

## 2018-06-12 RX ADMIN — HEPARIN SODIUM 800 UNIT(S)/HR: 5000 INJECTION INTRAVENOUS; SUBCUTANEOUS at 05:26

## 2018-06-12 RX ADMIN — Medication 1: at 11:46

## 2018-06-12 RX ADMIN — CLOPIDOGREL BISULFATE 75 MILLIGRAM(S): 75 TABLET, FILM COATED ORAL at 11:37

## 2018-06-12 RX ADMIN — Medication 40 MILLIEQUIVALENT(S): at 09:21

## 2018-06-12 RX ADMIN — SACUBITRIL AND VALSARTAN 1 TABLET(S): 24; 26 TABLET, FILM COATED ORAL at 17:19

## 2018-06-12 RX ADMIN — CARVEDILOL PHOSPHATE 12.5 MILLIGRAM(S): 80 CAPSULE, EXTENDED RELEASE ORAL at 05:08

## 2018-06-12 RX ADMIN — Medication 2 MILLIGRAM(S): at 15:11

## 2018-06-12 RX ADMIN — Medication 100 GRAM(S): at 15:54

## 2018-06-12 RX ADMIN — Medication 1 TABLET(S): at 09:35

## 2018-06-12 RX ADMIN — Medication 81 MILLIGRAM(S): at 11:37

## 2018-06-12 RX ADMIN — INSULIN GLARGINE 7 UNIT(S): 100 INJECTION, SOLUTION SUBCUTANEOUS at 21:17

## 2018-06-12 RX ADMIN — CARVEDILOL PHOSPHATE 12.5 MILLIGRAM(S): 80 CAPSULE, EXTENDED RELEASE ORAL at 17:19

## 2018-06-12 RX ADMIN — Medication 1: at 17:28

## 2018-06-12 NOTE — PROGRESS NOTE ADULT - SUBJECTIVE AND OBJECTIVE BOX
Patient is a 78y old  Male who presents with a chief complaint of transfer from [facility] (10 Simone 2018 12:56)      Overnight events:   Patient feeling well. Having non-bloody, soft diarrhea. Three times overnight. No associated abdominal pain. Patient denies chest pain, SOB, chills, nausea, vomiting.     MEDICATIONS  (STANDING):  aspirin enteric coated 81 milliGRAM(s) Oral daily  atorvastatin 80 milliGRAM(s) Oral at bedtime  carvedilol 12.5 milliGRAM(s) Oral every 12 hours  clopidogrel Tablet 75 milliGRAM(s) Oral daily  furosemide    Tablet 40 milliGRAM(s) Oral daily  heparin  Infusion.  Unit(s)/Hr (8 mL/Hr) IV Continuous <Continuous>  insulin glargine Injectable (LANTUS) 7 Unit(s) SubCutaneous at bedtime  insulin lispro (HumaLOG) corrective regimen sliding scale   SubCutaneous three times a day before meals  insulin lispro (HumaLOG) corrective regimen sliding scale   SubCutaneous at bedtime  latanoprost 0.005% Ophthalmic Solution 1 Drop(s) Both EYES at bedtime  potassium chloride    Tablet ER 40 milliEquivalent(s) Oral every 4 hours  sacubitril 49 mG/valsartan 51 mG 1 Tablet(s) Oral two times a day  tiotropium 18 MICROgram(s) Capsule 1 Capsule(s) Inhalation daily    MEDICATIONS  (PRN):  heparin  Injectable 4000 Unit(s) IV Push every 6 hours PRN For aPTT less than 40      REVIEW OF SYSTEMS:  Otherwise, 10 point ROS done and otherwise negative.    PHYSICAL EXAM:  Vital Signs Last 24 Hrs  T(C): 36.8 (12 Jun 2018 04:00), Max: 36.8 (12 Jun 2018 04:00)  T(F): 98.2 (12 Jun 2018 04:00), Max: 98.2 (12 Jun 2018 04:00)  HR: 60 (12 Jun 2018 07:00) (60 - 68)  BP: 141/78 (12 Jun 2018 07:00) (121/60 - 159/78)  BP(mean): 100 (12 Jun 2018 07:00) (82 - 121)  RR: 17 (12 Jun 2018 07:00) (14 - 35)  SpO2: 96% (12 Jun 2018 07:00) (90% - 97%)  I&O's Summary    11 Jun 2018 07:01  -  12 Jun 2018 07:00  --------------------------------------------------------  IN: 1096 mL / OUT: 2485 mL / NET: -1389 mL        Appearance: Seen lying in bed in NAD  HEENT: Sclera clear	  Cardiovascular: RRR, normal S1, S2, no m/g/r  Respiratory: Lungs CTAB	  Gastrointestinal: +BS, soft, non-tender, non-distended	  Neurologic: AAOx3  Psychiatry: Appropriate mood and affect  Extremities: No LE edema  Vascular: Peripheral pulses palpable 2+ bilaterally    LABS:	 	                        15.0   13.9  )-----------( 148      ( 12 Jun 2018 04:26 )             45.5     Auto Eosinophil # 0.3   / Auto Eosinophil % 2.3   / Auto Neutrophil # 10.5  / Auto Neutrophil % 75.5  / BANDS % x                            14.2   18.4  )-----------( 156      ( 11 Jun 2018 06:04 )             43.5     Auto Eosinophil # 0.0   / Auto Eosinophil % 0.1   / Auto Neutrophil # 15.7  / Auto Neutrophil % 85.4  / BANDS % x                            14.7   20.2  )-----------( 168      ( 10 Simone 2018 22:49 )             43.3     Auto Eosinophil # 0.0   / Auto Eosinophil % 0.2   / Auto Neutrophil # 18.0  / Auto Neutrophil % 88.9  / BANDS % x        INR: 1.07 ratio (06-12 @ 04:26)  INR: 1.08 ratio (06-11 @ 06:04)  INR: 1.12 ratio (06-10 @ 22:49)    06-12    143  |  102  |  27<H>  ----------------------------<  132<H>  3.2<L>   |  31  |  0.89  06-11    140  |  96  |  31<H>  ----------------------------<  204<H>  3.2<L>   |  33<H>  |  0.96  06-10    141  |  94<L>  |  30<H>  ----------------------------<  247<H>  4.2   |  33<H>  |  1.04    Ca    8.1<L>      12 Jun 2018 04:26  Mg     2.0     06-12  Phos  2.7     06-12  TPro  5.2<L>  /  Alb  2.7<L>  /  TBili  0.7  /  DBili  x   /  AST  20  /  ALT  21  /  AlkPhos  98  06-12  TPro  5.7<L>  /  Alb  3.0<L>  /  TBili  0.8  /  DBili  x   /  AST  20  /  ALT  21  /  AlkPhos  105  06-11  TPro  6.1  /  Alb  3.4  /  TBili  0.8  /  DBili  x   /  AST  20  /  ALT  21  /  AlkPhos  118  06-10        proBNP: Serum Pro-Brain Natriuretic Peptide: 46275 pg/mL (06-10 @ 13:32)    Lipid Profile: 06-10 Chol 136 LDL 72 HDL 50 Trig 72  HgA1c: 6.8 % (06-10 @ 16:56)    TSH: Thyroid Stimulating Hormone, Serum: 0.85 uIU/mL (06-10 @ 16:59)      CARDIAC MARKERS:   11 Jun 2018 06:04 Troponin 0.22 ng/mL / Creatine Kinase 85 U/L /  CKMB 3.9 ng/mL / CPK Mass Assay % x       10 Simone 2018 22:49 Troponin 0.26 ng/mL / Creatine Kinase 111 U/L /  CKMB 5.0 ng/mL / CPK Mass Assay % 4.5 %   10 Simone 2018 13:32 Troponin 0.27 ng/mL / Creatine Kinase 141 U/L /  CKMB 7.2 ng/mL / CPK Mass Assay % 5.1 %        TELEMETRY:   ECG:   RADIOLOGY:   OTHER:     PREVIOUS DIAGNOSTIC TESTING:    [X] Echocardiogram:  < from: Transthoracic Echocardiogram (06.10.18 @ 13:13) >  Dimensions:    Normal Values:  LA:     4.1    2.0 - 4.0 cm  Ao:     3.3    2.0 - 3.8 cm  SEPTUM: 0.6    0.6 - 1.2 cm  PWT:    0.6    0.6 -1.1 cm  LVIDd:  6.9    3.0 - 5.6 cm  LVIDs:  6.1    1.8 - 4.0 cm  Derived variables:  LVMI: 90 g/m2  RWT: 0.17  Fractional short: 12 %  EF (Visual Estimate): 20-25 %  ------------------------------------------------------------------------  Observations:  Mitral Valve: Tethered mitral valve leaflets with normal opening. Mild mitral regurgitation.  Aortic Valve/Aorta: Calcified trileaflet aortic valve with normal opening. No aortic valve regurgitation seen.  Aortic Root: 3.3 cm.  LVOT diameter: 2.3 cm.  Left Atrium: Mildly dilated left atrium.  LA volume index = 36 cc/m2.  Left Ventricle: Severe global left ventricular systolic dysfunction. Severe left ventricular enlargement. Normal diastolic function  Right Heart: A device wire is noted in the right heart. The right ventricle is not well visualized; grossly preserved right ventricular systolic function. Normal tricuspid valve. Minimal tricuspid regurgitation. Normal pulmonic valve.  Pericardium/Pleura: Normal pericardium with trace pericardial effusion.  Right pleural effusion.  ------------------------------------------------------------------------  Conclusions:  1. Tethered mitral valve leaflets with normal opening.  2. Calcified trileaflet aortic valve with normal opening.  3. Severe left ventricular enlargement.  4. Severe global left ventricular systolic dysfunction.  5. A device wire is noted in the right heart.  6. The right ventricle is not well visualized; grossly preserved  right ventricular systolic function.    [ ] Catheterization:  [X] Stress Test (from outside facility):   5/3/18 Dobutamine SPECT Myocardial Perfusion Imaging: fixed defect: medium sized, moderate inferior and a medium sized, moderate severity posterolateral and a small to medium sized, mild to mod severity apical defect. EF 29%. Regional wall motion was abnormal with a moderate sized area of moderate hypokinesis of lateral, inferior, posterior and apical walls.

## 2018-06-12 NOTE — CHART NOTE - NSCHARTNOTEFT_GEN_A_CORE
Removal of Radial Band    Pulses in the right upper extremity are palpable. The patient was placed in the supine position. The insertion site was identified and the band deflated per protocol. The radial band was removed slowly. Direct pressure was applied for  ______15 minutes/not applicable.      Monitoring of the right wrists and both upper extremities including neuro-vascular checks and vital signs every 15 minutes x 4.    Complications: None    Comments: pressure dressing applied

## 2018-06-12 NOTE — CHART NOTE - NSCHARTNOTEFT_GEN_A_CORE
====================  CCU MIDNIGHT ROUNDS  ====================    BRANDIE DEVINE  [id number]  Patient is a 78y old  Male who presents with a chief complaint of transfer from [facility] (10 Simone 2018 12:56)      ====================  SUMMARY:  ====================    HPI:  77 yo M PMH CHF (EF 25-30% in 10/16) s/p L sided BiV ICD, DM, HTN, CAD s/p 9 stents (Cath 11/2015-[facility]:  pLCX 90%, OM1 95%, OM2 80%, s/p PCI pLCX and OM1), CVA, COPD on 2L supplemental O2 at home, HLD transferred from [facility] for ACS? Per patient, yesterday morning he woke up and sat down on the couch. At rest, he became diaphoretic and was having acute difficulty breathing. Went to [facility]. Was treated for COPD exacerbation and PNA. Had a CTA that was reportedly negative and was then sent here when the T wave inversions in V4 and V5 were inverted and he had positive troponins. Per patient he was told that he had a negative FOBT.  Per family, patient has had increased THOMPSON over the last several months, requiring the oxygen that he uses on exertion to be needed more frequently. He also reports leg swelling. Denies any chest pain, cough, fevers, nausea, vomiting, diarrhea, melena.   He received methylprednisolone at [facility] as well as Lasix. Came in with jerzy. He also received Plavix today, ASA 81mg, coreg 12.5mg, albuterol.   Patient does endorse 50lb wt loss over the last year, unintentional. Attributing to lack of appetite. No night sweats. (10 Simone 2018 12:56)      ====================  NEW EVENTS:  ====================          ====================  VITALS (Last 12 hrs):  ====================    T(C): 36.7 (06-12-18 @ 19:00), Max: 36.7 (06-12-18 @ 19:00)  T(F): 98 (06-12-18 @ 19:00), Max: 98 (06-12-18 @ 19:00)  HR: 58 (06-12-18 @ 21:00) (58 - 66)  BP: 141/71 (06-12-18 @ 21:00) (112/63 - 150/75)  BP(mean): 105 (06-12-18 @ 21:00) (78 - 115)  ABP: --  ABP(mean): --  RR: 18 (06-12-18 @ 21:00) (12 - 23)  SpO2: 96% (06-12-18 @ 21:00) (92% - 96%)  Wt(kg): --  CVP(mm Hg): --  CVP(cm H2O): --  CO: --  CI: --  PA: --  PA(mean): --  PCWP: --  SVR: --  PVR: --    I&O's Summary    11 Jun 2018 07:01  -  12 Jun 2018 07:00  --------------------------------------------------------  IN: 1112 mL / OUT: 2765 mL / NET: -1653 mL    12 Jun 2018 07:01  -  12 Jun 2018 22:02  --------------------------------------------------------  IN: 920 mL / OUT: 935 mL / NET: -15 mL            ====================  NEW LABS:  ====================                          15.0   13.9  )-----------( 148      ( 12 Jun 2018 04:26 )             45.5     06-12    142  |  101  |  26<H>  ----------------------------<  126<H>  3.8   |  33<H>  |  0.92    Ca    8.4      12 Jun 2018 14:36  Phos  2.6     06-12  Mg     1.9     06-12    TPro  5.2<L>  /  Alb  2.7<L>  /  TBili  0.7  /  DBili  x   /  AST  20  /  ALT  21  /  AlkPhos  98  06-12    PT/INR - ( 12 Jun 2018 04:26 )   PT: 11.6 sec;   INR: 1.07 ratio         PTT - ( 12 Jun 2018 04:26 )  PTT:61.7 sec            ====================  PLAN:  ====================  77 yo M PMH CHF (EF 25-30% in 10/16) s/p L sided BiV ICD, DM, HTN, CAD s/p 9 stents, CVA, COPD on 2L supplemental O2 at home, HLD transferred from [facility] for concern of NSTEMI with an elevated troponin, EKG changes from prior, and CHF exacerbation.    #Neuro:  -No active issues, AOx3    #CV:  1. NSTEMI  -EKG with new T wave inversions in II, III, aVF and V4 and V5 from October, 2017  -Enoch trended down.   -On cardiac monitor  -Heparin normogram (ends at 2pm today). ASA, Plavix, Lipitor.  -Plan for cath if patient decides on it.  -5/3/18 Dobutamine SPECT Myocardial Perfusion Imaging: fixed defect: medium sized, moderate inferior and a medium sized, moderate severity posterolateral and a small to medium sized, mild to mod severity apical defect. EF 29%. Regional wall motion was abnormal with a moderate sized area of moderate hypokinesis of lateral, inferior, posterior and apical walls.     2. CHF  -TTE with severely reduced EF and LV dysfunction.  -Patient is net negative 3.8.  -Strict Is and Os  -BNP >53986  -CXR clear, but with pleural effusions on echo noted. Crackles improving. Ankle edema improving. WOB much improved, now on supplemental O2  -C/w Lasix 40mg QD, Entresto, Coreg    3. HTN  - Continue carvedilol and entresto    #Respiratory  1. Tachypnea  -Likely in setting of CHF exacerbation & NSTEMI. Much improved, RR 16-20 overnight.  -BiPAP and Lasix  -On supplemental O2    #GI:  -Consistent carb diet    #Endocrine:   1. DM  -On lantus 15u qhs at home, started on 7U qhs.   -ISS  -Consistent carbohydrate diet    #Heme:  -No active issues    #ID:  -No active issues    #DVT prophylaxis:  -heparin     #Advanced Directives:  -Pt filled out MOLST. Wants compressions and trial of intubation. Also amenable to pressors, abx, and blood products      Deejay Dean MD PGY2   [facility] Pager #: [phone]  [facility] Pager #: [phone] ====================  CCU MIDNIGHT ROUNDS  ====================    BRANDIE DEVINE  [id number]  Patient is a 78y old  Male who presents with a chief complaint of transfer from [facility] (10 Simone 2018 12:56)      ====================  SUMMARY:  ====================    HPI:  79 yo M PMH CHF (EF 25-30% in 10/16) s/p L sided BiV ICD, DM, HTN, CAD s/p 9 stents (Cath 11/2015-[facility]:  pLCX 90%, OM1 95%, OM2 80%, s/p PCI pLCX and OM1), CVA, COPD on 2L supplemental O2 at home, HLD transferred from [facility] for ACS? Per patient, yesterday morning he woke up and sat down on the couch. At rest, he became diaphoretic and was having acute difficulty breathing. Went to [facility]. Was treated for COPD exacerbation and PNA. Had a CTA that was reportedly negative and was then sent here when the T wave inversions in V4 and V5 were inverted and he had positive troponins. Per patient he was told that he had a negative FOBT.  Per family, patient has had increased THOMPSON over the last several months, requiring the oxygen that he uses on exertion to be needed more frequently. He also reports leg swelling. Denies any chest pain, cough, fevers, nausea, vomiting, diarrhea, melena.   He received methylprednisolone at [facility] as well as Lasix. Came in with jerzy. He also received Plavix today, ASA 81mg, coreg 12.5mg, albuterol.   Patient does endorse 50lb wt loss over the last year, unintentional. Attributing to lack of appetite. No night sweats. (10 Simone 2018 12:56)      ====================  NEW EVENTS:  ====================    The patient had a LHC today, which showed a 100% stenosis of OM1. Currently working up amyloidosis with serum light chain and NM scan.       ====================  VITALS (Last 12 hrs):  ====================    T(C): 36.7 (06-12-18 @ 19:00), Max: 36.7 (06-12-18 @ 19:00)  T(F): 98 (06-12-18 @ 19:00), Max: 98 (06-12-18 @ 19:00)  HR: 58 (06-12-18 @ 21:00) (58 - 66)  BP: 141/71 (06-12-18 @ 21:00) (112/63 - 150/75)  BP(mean): 105 (06-12-18 @ 21:00) (78 - 115)  ABP: --  ABP(mean): --  RR: 18 (06-12-18 @ 21:00) (12 - 23)  SpO2: 96% (06-12-18 @ 21:00) (92% - 96%)  Wt(kg): --  CVP(mm Hg): --  CVP(cm H2O): --  CO: --  CI: --  PA: --  PA(mean): --  PCWP: --  SVR: --  PVR: --    I&O's Summary    11 Jun 2018 07:01  -  12 Jun 2018 07:00  --------------------------------------------------------  IN: 1112 mL / OUT: 2765 mL / NET: -1653 mL    12 Jun 2018 07:01  -  12 Jun 2018 22:02  --------------------------------------------------------  IN: 920 mL / OUT: 935 mL / NET: -15 mL            ====================  NEW LABS:  ====================                          15.0   13.9  )-----------( 148      ( 12 Jun 2018 04:26 )             45.5     06-12    142  |  101  |  26<H>  ----------------------------<  126<H>  3.8   |  33<H>  |  0.92    Ca    8.4      12 Jun 2018 14:36  Phos  2.6     06-12  Mg     1.9     06-12    TPro  5.2<L>  /  Alb  2.7<L>  /  TBili  0.7  /  DBili  x   /  AST  20  /  ALT  21  /  AlkPhos  98  06-12    PT/INR - ( 12 Jun 2018 04:26 )   PT: 11.6 sec;   INR: 1.07 ratio         PTT - ( 12 Jun 2018 04:26 )  PTT:61.7 sec      MEDICATIONS  (STANDING):  aspirin enteric coated 81 milliGRAM(s) Oral daily  atorvastatin 80 milliGRAM(s) Oral at bedtime  carvedilol 12.5 milliGRAM(s) Oral every 12 hours  clopidogrel Tablet 75 milliGRAM(s) Oral daily  furosemide    Tablet 40 milliGRAM(s) Oral daily  insulin glargine Injectable (LANTUS) 7 Unit(s) SubCutaneous at bedtime  insulin lispro (HumaLOG) corrective regimen sliding scale   SubCutaneous at bedtime  insulin lispro (HumaLOG) corrective regimen sliding scale   SubCutaneous three times a day before meals  lactobacillus acidophilus 1 Tablet(s) Oral daily  latanoprost 0.005% Ophthalmic Solution 1 Drop(s) Both EYES at bedtime  sacubitril 49 mG/valsartan 51 mG 1 Tablet(s) Oral two times a day  tiotropium 18 MICROgram(s) Capsule 1 Capsule(s) Inhalation daily    MEDICATIONS  (PRN):  loperamide 2 milliGRAM(s) Oral every 3 hours PRN Diarrhea          ====================  PLAN:  ====================  79 yo M PMH CHF (EF 25-30% in 10/16) s/p L sided BiV ICD, DM, HTN, CAD s/p 9 stents, CVA, COPD on 2L supplemental O2 at home, HLD transferred from [facility] for concern of NSTEMI with an elevated troponin, EKG changes from prior, and CHF exacerbation.    #Neuro:  -No active issues, AOx3    #CV:  1. NSTEMI  -EKG with new T wave inversions in II, III, aVF and V4 and V5 from October, 2017  -Enoch trended down.   -On cardiac monitor  -Heparin normogram (ends at 2pm today). ASA, Plavix, Lipitor.  - Cath today: showed 100% stenosis of OM1, no intervention, medical management  -5/3/18 Dobutamine SPECT Myocardial Perfusion Imaging: fixed defect: medium sized, moderate inferior and a medium sized, moderate severity posterolateral and a small to medium sized, mild to mod severity apical defect. EF 29%. Regional wall motion was abnormal with a moderate sized area of moderate hypokinesis of lateral, inferior, posterior and apical walls.     2. CHF  -TTE with severely reduced EF and LV dysfunction.  -Patient is net negative 3.8.  -Strict Is and Os  -BNP >12787  -CXR clear, but with pleural effusions on echo noted. Crackles improving. Ankle edema improving. WOB much improved, now on supplemental O2  -C/w Lasix 40mg QD, Entresto, Coreg  - Will work up cardiac amyloidosis check serum light chains and NM scan     3. HTN  - Continue carvedilol and entresto    #Respiratory  1. Tachypnea  -Likely in setting of CHF exacerbation & NSTEMI. Much improved, RR 16-20 overnight.  -BiPAP and Lasix  -On supplemental O2    #GI:  -Consistent carb diet    #Endocrine:   1. DM  -On lantus 15u qhs at home, started on 7U qhs.   -ISS  -Consistent carbohydrate diet    #Heme:  -No active issues    #ID:  -No active issues    #DVT prophylaxis:  -heparin     #Advanced Directives:  -Pt filled out MOLST. Wants compressions and trial of intubation. Also amenable to pressors, abx, and blood products      Deejay Dean MD PGY2   [facility] Pager #: [phone]  [facility] Pager #: [phone]

## 2018-06-12 NOTE — CHART NOTE - NSCHARTNOTEFT_GEN_A_CORE
CCU Transfer Note    Hospital Course:   79 yo M PMH CHF (EF 25-30% in 10/16) s/p L sided BiV ICD, DM, HTN, CAD s/p 9 stents (Cath 11/2015-[facility]:  pLCX 90%, OM1 95%, OM2 80%, s/p PCI pLCX and OM1), CVA, COPD on 2L supplemental O2 at home, HLD transferred from [facility], where he went for acute onset SOB and diaphoresis, and transferred to [facility] CCU for NSTEMI (TWI in II, III, AvF, V3, V4) vs demand ischemia. At [facility], patient also got several doses of abx for reported tx of PNA. Per family, patient has had increased THOMPSON over the last several months, requiring the oxygen that he uses on exertion to be needed more frequently. Also reported 50lb wt loss over last year, unintentional, never had colonoscopy. In the CCU, patient was started on heparin and was diuresed. Echo here demonstrated EF 20-25%, severe LV enlargement, severe global LV systolic dysfunction. He required BiPAP for tachypnea but improved after bipap and diuresis on nasal canula. We filled out his MOLST form with his wife. Patient went for a catheterization that demonstrated: CCU Transfer Note    Transfer from: CCU    Transfer to: (  ) Medicine    ( x ) Telemetry     (   ) RCU        (    ) Palliative         (   ) Stroke Unit       (  ) MICU   (   ) __________________    Accepting Physician: Niecy Decker    Signout given to:     CCU COURSE:    77 yo M PMH CHF (EF 25-30% in 10/16) s/p L sided BiV ICD, DM, HTN, CAD s/p 9 stents (Cath 11/2015-[facility]:  pLCX 90%, OM1 95%, OM2 80%, s/p PCI pLCX and OM1), CVA, COPD on 2L supplemental O2 at home, HLD transferred from [facility], where he went for acute onset SOB and diaphoresis, and transferred to [facility] CCU for NSTEMI (TWI in II, III, AvF, V3, V4) vs demand ischemia. At [facility], patient also got several doses of abx for reported tx of PNA. Per family, patient has had increased THOMPSON over the last several months, requiring the oxygen that he uses on exertion to be needed more frequently. Also reported 50lb wt loss over last year, unintentional, never had colonoscopy. In the CCU, patient was started on heparin and was diuresed. Echo here demonstrated EF 20-25%, severe LV enlargement, severe global LV systolic dysfunction. He required BiPAP for tachypnea but improved after bipap and diuresis on nasal canula. We filled out his MOLST form with his wife. Patient went for a catheterization that demonstrated: LCX is chronic total occlusion and there is mild in-stent restenosis of LAD and RCA. No intervention was taken, recommended medical management. 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 Sent out serum light chains and technetium pyrophosphate scan.     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  Type 2 diabetes mellitus  Hyperlipidemia  Hypertension  Carotid stenosis: Bilateral  s/p left CEA 2013  50-79% of SANDRA  S/P cataract surgery: b/l  S/P carotid endarterectomy: left  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  Basal cell carcinoma of face: &#x27; 2013:  s/p Excision Basal Cancer Nose with MOHS  8/2017 left neck excision with skin graft  Status post angioplasty with stent: PCI x 9, last 11/2015      Vital Signs Last 24 Hrs  T(C): 36.7 (13 Jun 2018 00:00), Max: 37 (12 Jun 2018 08:00)  T(F): 98 (13 Jun 2018 00:00), Max: 98.6 (12 Jun 2018 08:00)  HR: 68 (13 Jun 2018 04:00) (58 - 68)  BP: 140/61 (13 Jun 2018 04:00) (112/63 - 160/92)  BP(mean): 90 (13 Jun 2018 04:00) (69 - 131)  RR: 19 (13 Jun 2018 04:00) (12 - 31)  SpO2: 94% (13 Jun 2018 04:00) (92% - 96%)  I&O's Summary    11 Jun 2018 07:01  -  12 Jun 2018 07:00  --------------------------------------------------------  IN: 1112 mL / OUT: 2765 mL / NET: -1653 mL    12 Jun 2018 07:01  -  13 Jun 2018 04:50  --------------------------------------------------------  IN: 1040 mL / OUT: 1385 mL / NET: -345 mL      PHYSICAL EXAM:  GENERAL: NAD, well-groomed, well-developed  HEAD:  Atraumatic, Normocephalic  EYES: EOMI, PERRLA, conjunctiva and sclera clear  ENMT: No tonsillar erythema, exudates, or enlargement; Moist mucous membranes, Good dentition, No lesions  NECK: Supple, No JVD, Normal thyroid  CHEST/LUNG: Clear to percussion bilaterally; No rales, rhonchi, wheezing, or rubs  HEART: Regular rate and rhythm; No murmurs, rubs, or gallops  ABDOMEN: Soft, Nontender, Nondistended; Bowel sounds present  VASCULAR:  2+ Peripheral Pulses, No clubbing, cyanosis, or edema  LYMPH: No lymphadenopathy noted  SKIN: No rashes or lesions  NERVOUS SYSTEM:  Alert & Oriented X3, Good concentration; Motor Strength 5/5 B/L upper and lower extremities; DTRs 2+ intact and symmetric    Allergies    No Known Allergies    Intolerances      MEDICATIONS  (STANDING):  aspirin enteric coated 81 milliGRAM(s) Oral daily  atorvastatin 80 milliGRAM(s) Oral at bedtime  carvedilol 12.5 milliGRAM(s) Oral every 12 hours  clopidogrel Tablet 75 milliGRAM(s) Oral daily  furosemide    Tablet 40 milliGRAM(s) Oral daily  insulin glargine Injectable (LANTUS) 7 Unit(s) SubCutaneous at bedtime  insulin lispro (HumaLOG) corrective regimen sliding scale   SubCutaneous at bedtime  insulin lispro (HumaLOG) corrective regimen sliding scale   SubCutaneous three times a day before meals  lactobacillus acidophilus 1 Tablet(s) Oral daily  latanoprost 0.005% Ophthalmic Solution 1 Drop(s) Both EYES at bedtime  sacubitril 49 mG/valsartan 51 mG 1 Tablet(s) Oral two times a day  tiotropium 18 MICROgram(s) Capsule 1 Capsule(s) Inhalation daily    MEDICATIONS  (PRN):  loperamide 2 milliGRAM(s) Oral every 3 hours PRN Diarrhea      Adult Advanced Hemodynamics Last 24 Hrs  CVP(mm Hg): --  CVP(cm H2O): --  CO: --  CI: --  PA: --  PA(mean): --  PCWP: --  SVR: --  SVRI: --  PVR: --  PVRI: --    CARDIAC MARKERS ( 11 Jun 2018 06:04 )  x     / 0.22 ng/mL / 85 U/L / x     / 3.9 ng/mL                            15.0   13.9  )-----------( 148      ( 12 Jun 2018 04:26 )             45.5     06-12    142  |  101  |  26<H>  ----------------------------<  126<H>  3.8   |  33<H>  |  0.92    Ca    8.4      12 Jun 2018 14:36  Phos  2.6     06-12  Mg     1.9     06-12    TPro  5.2<L>  /  Alb  2.7<L>  /  TBili  0.7  /  DBili  x   /  AST  20  /  ALT  21  /  AlkPhos  98  06-12    PT/INR - ( 12 Jun 2018 04:26 )   PT: 11.6 sec;   INR: 1.07 ratio         PTT - ( 12 Jun 2018 04:26 )  PTT:61.7 sec    Lactate:    Ekg:  Telemetry:  Echo:  Cardiac Cath:  Stress Test:  Imaging:    ASSESSMENT & PLAN:     77 yo M PMH CHF (EF 25-30% in 10/16) s/p L sided BiV ICD, DM, HTN, CAD s/p 9 stents, CVA, COPD on 2L supplemental O2 at home, HLD transferred from [facility] for concern of NSTEMI with an elevated troponin, EKG changes from prior, and CHF exacerbation. Cardiac cath showed no new stenosis, medical management.     #Neuro:  -No active issues, AOx3    #CV:  1. NSTEMI  -EKG with new T wave inversions in II, III, aVF and V4 and V5 from October, 2017  - Enoch trended down.   - On cardiac monitor  - ASA, Plavix, Lipitor.  - Cath today: showed no intervention, LCX is chronic total occlusion and there is mild in-stent restenosis of LAD and RCA, medical management  -5/3/18 Dobutamine SPECT Myocardial Perfusion Imaging: fixed defect: medium sized, moderate inferior and a medium sized, moderate severity posterolateral and a small to medium sized, mild to mod severity apical defect. EF 29%. Regional wall motion was abnormal with a moderate sized area of moderate hypokinesis of lateral, inferior, posterior and apical walls.       2. CHF  -TTE with severely reduced EF and LV dysfunction.  -Patient is net negative 3.8.  -Strict Is and Os  -BNP >04468  -CXR clear, but with pleural effusions on echo noted. Crackles improving. Ankle edema improving. WOB much improved, now on supplemental O2  -C/w Lasix 40mg QD, Entresto, Coreg  - Will work up cardiac amyloidosis check serum light chains and technetium pyrophosphate scan.      3. HTN  - Continue carvedilol and entresto    #Respiratory  Tachypnea  -Likely in setting of CHF exacerbation & NSTEMI. Much improved, RR 16-20 overnight.  -On supplemental O2    #GI:  -Consistent carb diet  Diarrhea  - C. diff and culture negative   - can consider imodium PRN for continued loose stools      #Endocrine:   1. DM  -On lantus 15u qhs at home, started on 7U qhs.   -ISS  -Consistent carbohydrate diet    #Heme:  -No active issues    #ID:  -No active issues    #DVT prophylaxis:  -heparin SQ    #Advanced Directives:  -Pt filled out MOLST. Wants compressions and trial of intubation. Also amenable to pressors, abx, and blood products          FOR FOLLOW UP:        CCU [phone]

## 2018-06-12 NOTE — PROGRESS NOTE ADULT - ASSESSMENT
79 yo M PMH CHF (EF 25-30% in 10/16) s/p L sided BiV ICD, DM, HTN, CAD s/p 9 stents, CVA, COPD on 2L supplemental O2 at home, HLD transferred from [facility] for concern of NSTEMI with an elevated troponin, EKG changes from prior, and CHF exacerbation.    #Neuro:  -No active issues, AOx3    #CV:  1. NSTEMI  -EKG with new T wave inversions in II, III, aVF and V4 and V5 from October, 2017  -Enoch trended down.   -On cardiac monitor  -Heparin normogram (ends at 2pm today). ASA, Plavix, Lipitor.  -Plan for cath if patient decides on it.  -5/3/18 Dobutamine SPECT Myocardial Perfusion Imaging: fixed defect: medium sized, moderate inferior and a medium sized, moderate severity posterolateral and a small to medium sized, mild to mod severity apical defect. EF 29%. Regional wall motion was abnormal with a moderate sized area of moderate hypokinesis of lateral, inferior, posterior and apical walls.     2. CHF  -TTE with severely reduced EF and LV dysfunction.  -Patient is net negative 3.8.  -Strict Is and Os  -BNP >73409  -CXR clear, but with pleural effusions on echo noted. Crackles improving. Ankle edema improving. WOB much improved, now on supplemental O2  -C/w Lasix 40mg QD, Entresto, Coreg    3. HTN  - Continue carvedilol and entresto    #Respiratory  1. Tachypnea  -Likely in setting of CHF exacerbation & NSTEMI. Much improved, RR 16-20 overnight.  -BiPAP and Lasix  -On supplemental O2    #GI:  -Consistent carb diet    #Endocrine:   1. DM  -On lantus 15u qhs at home, started on 7U qhs.   -ISS  -Consistent carbohydrate diet    #Heme:  -No active issues    #ID:  -No active issues    #DVT prophylaxis:  -heparin     #Advanced Directives:  -Pt filled out MOLST. Wants compressions and trial of intubation. Also amenable to pressors, abx, and blood products

## 2018-06-13 DIAGNOSIS — I10 ESSENTIAL (PRIMARY) HYPERTENSION: ICD-10-CM

## 2018-06-13 DIAGNOSIS — E11.9 TYPE 2 DIABETES MELLITUS WITHOUT COMPLICATIONS: ICD-10-CM

## 2018-06-13 DIAGNOSIS — R19.7 DIARRHEA, UNSPECIFIED: ICD-10-CM

## 2018-06-13 DIAGNOSIS — I21.4 NON-ST ELEVATION (NSTEMI) MYOCARDIAL INFARCTION: ICD-10-CM

## 2018-06-13 DIAGNOSIS — I25.10 ATHEROSCLEROTIC HEART DISEASE OF NATIVE CORONARY ARTERY WITHOUT ANGINA PECTORIS: ICD-10-CM

## 2018-06-13 DIAGNOSIS — I50.22 CHRONIC SYSTOLIC (CONGESTIVE) HEART FAILURE: ICD-10-CM

## 2018-06-13 DIAGNOSIS — J44.9 CHRONIC OBSTRUCTIVE PULMONARY DISEASE, UNSPECIFIED: ICD-10-CM

## 2018-06-13 LAB
ALBUMIN SERPL ELPH-MCNC: 2.6 G/DL — LOW (ref 3.3–5)
ALP SERPL-CCNC: 99 U/L — SIGNIFICANT CHANGE UP (ref 40–120)
ALT FLD-CCNC: 19 U/L — SIGNIFICANT CHANGE UP (ref 10–45)
ANION GAP SERPL CALC-SCNC: 8 MMOL/L — SIGNIFICANT CHANGE UP (ref 5–17)
APTT BLD: 30 SEC — SIGNIFICANT CHANGE UP (ref 27.5–37.4)
AST SERPL-CCNC: 16 U/L — SIGNIFICANT CHANGE UP (ref 10–40)
BILIRUB SERPL-MCNC: 0.8 MG/DL — SIGNIFICANT CHANGE UP (ref 0.2–1.2)
BUN SERPL-MCNC: 23 MG/DL — SIGNIFICANT CHANGE UP (ref 7–23)
CALCIUM SERPL-MCNC: 8.4 MG/DL — SIGNIFICANT CHANGE UP (ref 8.4–10.5)
CHLORIDE SERPL-SCNC: 103 MMOL/L — SIGNIFICANT CHANGE UP (ref 96–108)
CO2 SERPL-SCNC: 31 MMOL/L — SIGNIFICANT CHANGE UP (ref 22–31)
CREAT SERPL-MCNC: 0.9 MG/DL — SIGNIFICANT CHANGE UP (ref 0.5–1.3)
CULTURE RESULTS: SIGNIFICANT CHANGE UP
GLUCOSE BLDC GLUCOMTR-MCNC: 109 MG/DL — HIGH (ref 70–99)
GLUCOSE BLDC GLUCOMTR-MCNC: 132 MG/DL — HIGH (ref 70–99)
GLUCOSE BLDC GLUCOMTR-MCNC: 200 MG/DL — HIGH (ref 70–99)
GLUCOSE BLDC GLUCOMTR-MCNC: 228 MG/DL — HIGH (ref 70–99)
GLUCOSE SERPL-MCNC: 101 MG/DL — HIGH (ref 70–99)
HCT VFR BLD CALC: 46.8 % — SIGNIFICANT CHANGE UP (ref 39–50)
HGB BLD-MCNC: 15.5 G/DL — SIGNIFICANT CHANGE UP (ref 13–17)
INR BLD: 1.04 RATIO — SIGNIFICANT CHANGE UP (ref 0.88–1.16)
MAGNESIUM SERPL-MCNC: 2 MG/DL — SIGNIFICANT CHANGE UP (ref 1.6–2.6)
MCHC RBC-ENTMCNC: 32.3 PG — SIGNIFICANT CHANGE UP (ref 27–34)
MCHC RBC-ENTMCNC: 33.1 GM/DL — SIGNIFICANT CHANGE UP (ref 32–36)
MCV RBC AUTO: 97.5 FL — SIGNIFICANT CHANGE UP (ref 80–100)
PHOSPHATE SERPL-MCNC: 2.5 MG/DL — SIGNIFICANT CHANGE UP (ref 2.5–4.5)
PLATELET # BLD AUTO: 147 K/UL — LOW (ref 150–400)
POTASSIUM SERPL-MCNC: 4 MMOL/L — SIGNIFICANT CHANGE UP (ref 3.5–5.3)
POTASSIUM SERPL-SCNC: 4 MMOL/L — SIGNIFICANT CHANGE UP (ref 3.5–5.3)
PROT SERPL-MCNC: 5.2 G/DL — LOW (ref 6–8.3)
PROTHROM AB SERPL-ACNC: 11.3 SEC — SIGNIFICANT CHANGE UP (ref 9.8–12.7)
RBC # BLD: 4.79 M/UL — SIGNIFICANT CHANGE UP (ref 4.2–5.8)
RBC # FLD: 13.3 % — SIGNIFICANT CHANGE UP (ref 10.3–14.5)
SODIUM SERPL-SCNC: 142 MMOL/L — SIGNIFICANT CHANGE UP (ref 135–145)
SPECIMEN SOURCE: SIGNIFICANT CHANGE UP
WBC # BLD: 12 K/UL — HIGH (ref 3.8–10.5)
WBC # FLD AUTO: 12 K/UL — HIGH (ref 3.8–10.5)

## 2018-06-13 PROCEDURE — 99233 SBSQ HOSP IP/OBS HIGH 50: CPT | Mod: GC

## 2018-06-13 PROCEDURE — 93010 ELECTROCARDIOGRAM REPORT: CPT

## 2018-06-13 PROCEDURE — 99233 SBSQ HOSP IP/OBS HIGH 50: CPT

## 2018-06-13 RX ORDER — HEPARIN SODIUM 5000 [USP'U]/ML
5000 INJECTION INTRAVENOUS; SUBCUTANEOUS EVERY 8 HOURS
Qty: 0 | Refills: 0 | Status: DISCONTINUED | OUTPATIENT
Start: 2018-06-13 | End: 2018-06-14

## 2018-06-13 RX ADMIN — SACUBITRIL AND VALSARTAN 1 TABLET(S): 24; 26 TABLET, FILM COATED ORAL at 18:14

## 2018-06-13 RX ADMIN — INSULIN GLARGINE 7 UNIT(S): 100 INJECTION, SOLUTION SUBCUTANEOUS at 21:34

## 2018-06-13 RX ADMIN — Medication 1 TABLET(S): at 09:13

## 2018-06-13 RX ADMIN — CARVEDILOL PHOSPHATE 12.5 MILLIGRAM(S): 80 CAPSULE, EXTENDED RELEASE ORAL at 05:18

## 2018-06-13 RX ADMIN — HEPARIN SODIUM 5000 UNIT(S): 5000 INJECTION INTRAVENOUS; SUBCUTANEOUS at 05:18

## 2018-06-13 RX ADMIN — HEPARIN SODIUM 5000 UNIT(S): 5000 INJECTION INTRAVENOUS; SUBCUTANEOUS at 21:33

## 2018-06-13 RX ADMIN — Medication 81 MILLIGRAM(S): at 09:13

## 2018-06-13 RX ADMIN — CARVEDILOL PHOSPHATE 12.5 MILLIGRAM(S): 80 CAPSULE, EXTENDED RELEASE ORAL at 18:14

## 2018-06-13 RX ADMIN — SACUBITRIL AND VALSARTAN 1 TABLET(S): 24; 26 TABLET, FILM COATED ORAL at 05:18

## 2018-06-13 RX ADMIN — Medication 40 MILLIGRAM(S): at 05:18

## 2018-06-13 RX ADMIN — ATORVASTATIN CALCIUM 80 MILLIGRAM(S): 80 TABLET, FILM COATED ORAL at 21:34

## 2018-06-13 RX ADMIN — TIOTROPIUM BROMIDE 1 CAPSULE(S): 18 CAPSULE ORAL; RESPIRATORY (INHALATION) at 21:54

## 2018-06-13 RX ADMIN — CLOPIDOGREL BISULFATE 75 MILLIGRAM(S): 75 TABLET, FILM COATED ORAL at 09:13

## 2018-06-13 RX ADMIN — LATANOPROST 1 DROP(S): 0.05 SOLUTION/ DROPS OPHTHALMIC; TOPICAL at 21:53

## 2018-06-13 RX ADMIN — Medication 1: at 13:25

## 2018-06-13 RX ADMIN — HEPARIN SODIUM 5000 UNIT(S): 5000 INJECTION INTRAVENOUS; SUBCUTANEOUS at 13:25

## 2018-06-13 NOTE — CONSULT NOTE ADULT - SUBJECTIVE AND OBJECTIVE BOX
Patient is a 78y Male     Patient is a 78y old  Male who presents with a chief complaint of transfer from [facility] (10 Simone 2018 12:56)      HPI:  79 yo M PMH CHF (EF 25-30% in 10/16) s/p L sided BiV ICD, DM, HTN, CAD s/p 9 stents (Cath 11/2015-[facility]:  pLCX 90%, OM1 95%, OM2 80%, s/p PCI pLCX and OM1), CVA, COPD on 2L supplemental O2 at home, HLD transferred from [facility] for ACS? Per patient, yesterday morning he woke up and sat down on the couch. At rest, he became diaphoretic and was having acute difficulty breathing. Went to [facility]. Was treated for COPD exacerbation and PNA. Had a CTA that was reportedly negative and was then sent here when the T wave inversions in V4 and V5 were inverted and he had positive troponins. Per patient he was told that he had a negative FOBT.  Per family, patient has had increased THOMPSON over the last several months, requiring the oxygen that he uses on exertion to be needed more frequently. He also reports leg swelling. Denies any chest pain, cough, fevers, nausea, vomiting, diarrhea, melena.   He received methylprednisolone at [facility] as well as Lasix. Came in with jerzy. He also received Plavix today, ASA 81mg, coreg 12.5mg, albuterol.   Patient does endorse 50lb wt loss over the last year, unintentional. Attributing to lack of appetite. No night sweats. (10 Simone 2018 12:56)  6 month h/o soft to watery diarrhea, not always associated with meals, 5x/d with fecal incontinence. No blood or melena. No travel or abx. No prior abd surgeries and no prior colonoscopy.  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  Type 2 diabetes mellitus  Hyperlipidemia  Hypertension  Carotid stenosis: Bilateral  s/p left CEA 2013  50-79% of SANDRA  S/P cataract surgery: b/l  S/P carotid endarterectomy: left  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  Basal cell carcinoma of face: &#x27; 2013:  s/p Excision Basal Cancer Nose with MOHS  8/2017 left neck excision with skin graft  Status post angioplasty with stent: PCI x 9, last 11/2015      MEDICATIONS  (STANDING):  aspirin enteric coated 81 milliGRAM(s) Oral daily  atorvastatin 80 milliGRAM(s) Oral at bedtime  carvedilol 12.5 milliGRAM(s) Oral every 12 hours  clopidogrel Tablet 75 milliGRAM(s) Oral daily  furosemide    Tablet 40 milliGRAM(s) Oral daily  heparin  Injectable 5000 Unit(s) SubCutaneous every 8 hours  insulin glargine Injectable (LANTUS) 7 Unit(s) SubCutaneous at bedtime  insulin lispro (HumaLOG) corrective regimen sliding scale   SubCutaneous at bedtime  insulin lispro (HumaLOG) corrective regimen sliding scale   SubCutaneous three times a day before meals  lactobacillus acidophilus 1 Tablet(s) Oral daily  latanoprost 0.005% Ophthalmic Solution 1 Drop(s) Both EYES at bedtime  sacubitril 49 mG/valsartan 51 mG 1 Tablet(s) Oral two times a day  tiotropium 18 MICROgram(s) Capsule 1 Capsule(s) Inhalation daily      Allergies    No Known Allergies    Intolerances        SOCIAL HISTORY:  Denies ETOh,Smoking,     FAMILY HISTORY:  Family history of heart disease (Sibling)      REVIEW OF SYSTEMS:    CONSTITUTIONAL: No , fevers or chills  EYES/ENT: No visual changes;  No vertigo or throat pain   NECK: No pain or stiffness  RESPIRATORY: No cough, wheezing, hemoptysis; No shortness of breath  GASTROINTESTINAL: No abdominal or epigastric pain. No nausea, vomiting, or hematemesis;  No melena or hematochezia.  GENITOURINARY: No dysuria, frequency or hematuria    SKIN: No  rashes,      VITAL:  T(C): , Max: 36.9 (06-13-18 @ 06:51)  T(F): , Max: 98.4 (06-13-18 @ 06:51)  HR: 59 (06-13-18 @ 13:25)  BP: 151/86 (06-13-18 @ 13:25)  BP(mean): 90 (06-13-18 @ 04:00)  RR: 18 (06-13-18 @ 13:25)  SpO2: 95% (06-13-18 @ 13:25)  Wt(kg): --    I and O's:    06-11 @ 07:01  -  06-12 @ 07:00  --------------------------------------------------------  IN: 1112 mL / OUT: 2765 mL / NET: -1653 mL    06-12 @ 07:01  -  06-13 @ 07:00  --------------------------------------------------------  IN: 1040 mL / OUT: 1385 mL / NET: -345 mL    06-13 @ 07:01  -  06-13 @ 19:37  --------------------------------------------------------  IN: 380 mL / OUT: 680 mL / NET: -300 mL          PHYSICAL EXAM:    Constitutional: NAD  HEENT: PERRLA,   Neck: No JVD  Respiratory: CTA B/L  Cardiovascular: S1 and S2  Gastrointestinal: BS+, soft, NT/ND  Extremities: No peripheral edema  Neurological: A/O x 3, no focal deficits  Psychiatric: Normal mood, normal affect  : No Mcneal  Skin: No rashes multiple eccymosis    LABS:                        15.5   12.0  )-----------( 147      ( 13 Jun 2018 05:09 )             46.8     06-13    142  |  103  |  23  ----------------------------<  101<H>  4.0   |  31  |  0.90    Ca    8.4      13 Jun 2018 05:09  Phos  2.5     06-13  Mg     2.0     06-13    TPro  5.2<L>  /  Alb  2.6<L>  /  TBili  0.8  /  DBili  x   /  AST  16  /  ALT  19  /  AlkPhos  99  06-13          RADIOLOGY & ADDITIONAL STUDIES:

## 2018-06-13 NOTE — CONSULT NOTE ADULT - ASSESSMENT
Chronic diarrhea, not clearly osmotic by history, associated with significant weight loss. Multiple possible etiologies including maldigestion/malabsorption due bowel edema/ischemia related to heart failure, celiac, amyloid, pancreatic with steatorrhea, neoplasm/neuroendocrine tumor, vs infectious, inflammatory , medication induced (entresto), microscopic colitis, etc    Suggest    Check stool osm, Na and K to calculate osmolar gap to differentiate secretory from osmotic diarrhea  stool for calprotectin, occult blood, giardia ag, sudan stain for fecal fat, elastase  celiac panel, Vit K, B12, folate, iron studies, beta carotene  CT scan abd/pelvis with contrast  may need flex sig with bx r/o microscopic colitis and amyloid, pt at high risk for anesthesia and full colonoscopy-pt has AICD and os on plavix  also may have to stop Entresto  lomotil after stool samples collected

## 2018-06-13 NOTE — CHART NOTE - NSCHARTNOTEFT_GEN_A_CORE
Per CCU Note:    "CCU COURSE:    79 yo M PMH CHF (EF 25-30% in 10/16) s/p L sided BiV ICD, DM, HTN, CAD s/p 9 stents (Cath 11/2015-[facility]:  pLCX 90%, OM1 95%, OM2 80%, s/p PCI pLCX and OM1), CVA, COPD on 2L supplemental O2 at home, HLD transferred from [facility], where he went for acute onset SOB and diaphoresis, and transferred to [facility] CCU for NSTEMI (TWI in II, III, AvF, V3, V4) vs demand ischemia. At [facility], patient also got several doses of abx for reported tx of PNA. Per family, patient has had increased THOMPSON over the last several months, requiring the oxygen that he uses on exertion to be needed more frequently. Also reported 50lb wt loss over last year, unintentional, never had colonoscopy. In the CCU, patient was started on heparin and was diuresed. Echo here demonstrated EF 20-25%, severe LV enlargement, severe global LV systolic dysfunction. He required BiPAP for tachypnea but improved after bipap and diuresis on nasal canula. We filled out his MOLST form with his wife. Patient went for a catheterization that demonstrated: LCX is chronic total occlusion and there is mild in-stent restenosis of LAD and RCA. No intervention was taken, recommended medical management. 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 Sent out serum light chains and technetium pyrophosphate scan.       ASSESSMENT & PLAN:     79 yo M PMH CHF (EF 25-30% in 10/16) s/p L sided BiV ICD, DM, HTN, CAD s/p 9 stents, CVA, COPD on 2L supplemental O2 at home, HLD transferred from [facility] for concern of NSTEMI with an elevated troponin, EKG changes from prior, and CHF exacerbation. Cardiac cath showed no new stenosis, medical management.     #Neuro:  -No active issues, AOx3    #CV:  1. NSTEMI  -EKG with new T wave inversions in II, III, aVF and V4 and V5 from October, 2017  - Enoch trended down.   - On cardiac monitor  - ASA, Plavix, Lipitor.  - Cath today: showed no intervention, LCX is chronic total occlusion and there is mild in-stent restenosis of LAD and RCA, medical management  -5/3/18 Dobutamine SPECT Myocardial Perfusion Imaging: fixed defect: medium sized, moderate inferior and a medium sized, moderate severity posterolateral and a small to medium sized, mild to mod severity apical defect. EF 29%. Regional wall motion was abnormal with a moderate sized area of moderate hypokinesis of lateral, inferior, posterior and apical walls.       2. CHF  -TTE with severely reduced EF and LV dysfunction.  -Patient is net negative 3.8.  -Strict Is and Os  -BNP >89398  -CXR clear, but with pleural effusions on echo noted. Crackles improving. Ankle edema improving. WOB much improved, now on supplemental O2  -C/w Lasix 40mg QD, Entresto, Coreg  - Will work up cardiac amyloidosis check serum light chains and technetium pyrophosphate scan.      3. HTN  - Continue carvedilol and entresto    #Respiratory  Tachypnea  -Likely in setting of CHF exacerbation & NSTEMI. Much improved, RR 16-20 overnight.  -On supplemental O2    #GI:  -Consistent carb diet  Diarrhea  - C. diff and culture negative   - can consider imodium PRN for continued loose stools      #Endocrine:   1. DM  -On lantus 15u qhs at home, started on 7U qhs.   -ISS  -Consistent carbohydrate diet    #Heme:  -No active issues    #ID:  -No active issues    #DVT prophylaxis:  -heparin SQ    #Advanced Directives:  -Pt filled out MOLST. Wants compressions and trial of intubation. Also amenable to pressors, abx, and blood products"    Patient seen and evaluated by me at bedside upon arrivals to floors. Patient appears well, no acute distress, AAOx3, feels "ready to go home," and without any complaints. Patient signed to ADS NP at 6:07am, care to be assumed by ADS service going forward.    Michelle Miller PGY3  [facility] [id number]

## 2018-06-13 NOTE — PROGRESS NOTE ADULT - SUBJECTIVE AND OBJECTIVE BOX
24H hour events: No acute events overnight.     MEDICATIONS:  aspirin enteric coated 81 milliGRAM(s) Oral daily  carvedilol 12.5 milliGRAM(s) Oral every 12 hours  clopidogrel Tablet 75 milliGRAM(s) Oral daily  furosemide    Tablet 40 milliGRAM(s) Oral daily  heparin  Injectable 5000 Unit(s) SubCutaneous every 8 hours  sacubitril 49 mG/valsartan 51 mG 1 Tablet(s) Oral two times a day      tiotropium 18 MICROgram(s) Capsule 1 Capsule(s) Inhalation daily      loperamide 2 milliGRAM(s) Oral every 3 hours PRN    atorvastatin 80 milliGRAM(s) Oral at bedtime  insulin glargine Injectable (LANTUS) 7 Unit(s) SubCutaneous at bedtime  insulin lispro (HumaLOG) corrective regimen sliding scale   SubCutaneous at bedtime  insulin lispro (HumaLOG) corrective regimen sliding scale   SubCutaneous three times a day before meals    latanoprost 0.005% Ophthalmic Solution 1 Drop(s) Both EYES at bedtime      REVIEW OF SYSTEMS:  General: no fatigue/malaise, weight loss/gain.  Skin: no rashes.  Ophthalmologic: no blurred vision, no loss of vision. 	  ENT: no sore throat, rhinorrhea, sinus congestion.  Respiratory: no SOB, cough or wheeze.  Gastrointestinal:  no N/V/D, no melena/hematemesis/hematochezia.  Genitourinary: no dysuria/hesitancy or hematuria.  Musculoskeletal: no myalgias or arthralgias.  Neurological: no changes in vision or hearing, no lightheadedness/dizziness, no syncope/near syncope	  Psychiatric: no unusual stress/anxiety.   Hematology/Lymphatics: no unusual bleeding, bruising and no lymphadenopathy.  Endocrine: no unusual thirst.   All others negative except as stated above and in HPI.    PHYSICAL EXAM:  T(C): 36.9 (06-13-18 @ 06:51), Max: 36.9 (06-13-18 @ 06:51)  HR: 62 (06-13-18 @ 06:51) (58 - 68)  BP: 129/76 (06-13-18 @ 06:51) (112/63 - 160/92)  RR: 18 (06-13-18 @ 06:51) (12 - 23)  SpO2: 95% (06-13-18 @ 06:51) (92% - 96%)  Wt(kg): --  I&O's Summary    12 Jun 2018 07:01  -  13 Jun 2018 07:00  --------------------------------------------------------  IN: 1040 mL / OUT: 1385 mL / NET: -345 mL        Appearance: Normal	  HEENT:   Normal oral mucosa  Cardiovascular: RRR, No JVD, No edema  Respiratory: Lungs clear to auscultation	  Psychiatry: Mood & affect appropriate  Gastrointestinal:  Soft  Skin: No rashes, No ecchymoses, No cyanosis	  Neurologic: Non-focal  Extremities: No clubbing, cyanosis or edema  Vascular: Peripheral pulses palpable         LABS:	 	    CBC Full  -  ( 13 Jun 2018 05:09 )  WBC Count : 12.0 K/uL  Hemoglobin : 15.5 g/dL  Hematocrit : 46.8 %  Platelet Count - Automated : 147 K/uL  Mean Cell Volume : 97.5 fl  Mean Cell Hemoglobin : 32.3 pg  Mean Cell Hemoglobin Concentration : 33.1 gm/dL  Auto Neutrophil # : x  Auto Lymphocyte # : x  Auto Monocyte # : x  Auto Eosinophil # : x  Auto Basophil # : x  Auto Neutrophil % : x  Auto Lymphocyte % : x  Auto Monocyte % : x  Auto Eosinophil % : x  Auto Basophil % : x    06-13    142  |  103  |  23  ----------------------------<  101<H>  4.0   |  31  |  0.90  06-12    142  |  101  |  26<H>  ----------------------------<  126<H>  3.8   |  33<H>  |  0.92    Ca    8.4      13 Jun 2018 05:09  Ca    8.4      12 Jun 2018 14:36  Phos  2.5     06-13  Phos  2.6     06-12  Mg     2.0     06-13  Mg     1.9     06-12    TPro  5.2<L>  /  Alb  2.6<L>  /  TBili  0.8  /  DBili  x   /  AST  16  /  ALT  19  /  AlkPhos  99  06-13  TPro  5.2<L>  /  Alb  2.7<L>  /  TBili  0.7  /  DBili  x   /  AST  20  /  ALT  21  /  AlkPhos  98  06-12      proBNP: Serum Pro-Brain Natriuretic Peptide: 52954 pg/mL (06-10-18 @ 13:32)    Lipid Profile:   HgA1c:   TSH:       CARDIAC MARKERS:  Troponin T, Serum: 0.22 ng/mL (06-11 @ 06:04)  Troponin T, Serum: 0.26 ng/mL (06-10 @ 22:49)  Troponin T, Serum: 0.27 ng/mL (06-10 @ 13:32)          TELEMETRY: 	    ECG:  	  RADIOLOGY:  OTHER: 	    PREVIOUS DIAGNOSTIC TESTING:      Echocardiogram:    < from: Transthoracic Echocardiogram (06.10.18 @ 13:13) >  Conclusions:  1. Tethered mitral valve leaflets with normal opening.  2. Calcified trileaflet aortic valve with normal opening.  3. Severe left ventricular enlargement.  4. Severe global left ventricular systolic dysfunction.  5. A device wire is noted in the right heart.  6. The right ventricle is not well visualized; grossly  preserved  right ventricular systolic function.    [ ]  Catheterization:  [ ] Stress Test:  	  	  ASSESSMENT/PLAN: 79 y/o man h/o Chronic Systolic HF s/p BiV AICD, DM, HTN, HLD, CAD s/p PCI, CVA, COPD on Home O2 who presents with NSTEMI    1) NSTEMI - no evidence of significant CAD progression (LCX is  and there is mild in-stent restenosis of LAD and RCA)  - continue DAPT (Aspirin/Clopidogrel), Atorvastatin 80 mg PO Daily, Carvedilol 12.5 mg PO Q12H  	        Horacio Roberts  Cardiology Fellow  Consult Fellow carries in-house phone [phone]) from 7:30 am - 5:00 pm M - F  For non-urgent issues outside of the above time period, please feel free to contact me directly by text or call at [phone] 24H hour events: No acute events overnight.     MEDICATIONS:  aspirin enteric coated 81 milliGRAM(s) Oral daily  carvedilol 12.5 milliGRAM(s) Oral every 12 hours  clopidogrel Tablet 75 milliGRAM(s) Oral daily  furosemide    Tablet 40 milliGRAM(s) Oral daily  heparin  Injectable 5000 Unit(s) SubCutaneous every 8 hours  sacubitril 49 mG/valsartan 51 mG 1 Tablet(s) Oral two times a day      tiotropium 18 MICROgram(s) Capsule 1 Capsule(s) Inhalation daily      loperamide 2 milliGRAM(s) Oral every 3 hours PRN    atorvastatin 80 milliGRAM(s) Oral at bedtime  insulin glargine Injectable (LANTUS) 7 Unit(s) SubCutaneous at bedtime  insulin lispro (HumaLOG) corrective regimen sliding scale   SubCutaneous at bedtime  insulin lispro (HumaLOG) corrective regimen sliding scale   SubCutaneous three times a day before meals    latanoprost 0.005% Ophthalmic Solution 1 Drop(s) Both EYES at bedtime      REVIEW OF SYSTEMS:  General: no fatigue/malaise, weight loss/gain.  Skin: no rashes.  Ophthalmologic: no blurred vision, no loss of vision. 	  ENT: no sore throat, rhinorrhea, sinus congestion.  Respiratory: no SOB, cough or wheeze.  Gastrointestinal:  no N/V/D, no melena/hematemesis/hematochezia.  Genitourinary: no dysuria/hesitancy or hematuria.  Musculoskeletal: no myalgias or arthralgias.  Neurological: no changes in vision or hearing, no lightheadedness/dizziness, no syncope/near syncope	  Psychiatric: no unusual stress/anxiety.   Hematology/Lymphatics: no unusual bleeding, bruising and no lymphadenopathy.  Endocrine: no unusual thirst.   All others negative except as stated above and in HPI.    PHYSICAL EXAM:  T(C): 36.9 (06-13-18 @ 06:51), Max: 36.9 (06-13-18 @ 06:51)  HR: 62 (06-13-18 @ 06:51) (58 - 68)  BP: 129/76 (06-13-18 @ 06:51) (112/63 - 160/92)  RR: 18 (06-13-18 @ 06:51) (12 - 23)  SpO2: 95% (06-13-18 @ 06:51) (92% - 96%)  Wt(kg): --  I&O's Summary    12 Jun 2018 07:01  -  13 Jun 2018 07:00  --------------------------------------------------------  IN: 1040 mL / OUT: 1385 mL / NET: -345 mL        Appearance: Normal	  HEENT:   Normal oral mucosa  Cardiovascular: RRR, No JVD, No edema  Respiratory: Lungs clear to auscultation	  Psychiatry: Mood & affect appropriate  Gastrointestinal:  Soft  Skin: No rashes, No ecchymoses, No cyanosis	  Neurologic: Non-focal  Extremities: No clubbing, cyanosis or edema  Vascular: Peripheral pulses palpable         LABS:	 	    CBC Full  -  ( 13 Jun 2018 05:09 )  WBC Count : 12.0 K/uL  Hemoglobin : 15.5 g/dL  Hematocrit : 46.8 %  Platelet Count - Automated : 147 K/uL  Mean Cell Volume : 97.5 fl  Mean Cell Hemoglobin : 32.3 pg  Mean Cell Hemoglobin Concentration : 33.1 gm/dL  Auto Neutrophil # : x  Auto Lymphocyte # : x  Auto Monocyte # : x  Auto Eosinophil # : x  Auto Basophil # : x  Auto Neutrophil % : x  Auto Lymphocyte % : x  Auto Monocyte % : x  Auto Eosinophil % : x  Auto Basophil % : x    06-13    142  |  103  |  23  ----------------------------<  101<H>  4.0   |  31  |  0.90  06-12    142  |  101  |  26<H>  ----------------------------<  126<H>  3.8   |  33<H>  |  0.92    Ca    8.4      13 Jun 2018 05:09  Ca    8.4      12 Jun 2018 14:36  Phos  2.5     06-13  Phos  2.6     06-12  Mg     2.0     06-13  Mg     1.9     06-12    TPro  5.2<L>  /  Alb  2.6<L>  /  TBili  0.8  /  DBili  x   /  AST  16  /  ALT  19  /  AlkPhos  99  06-13  TPro  5.2<L>  /  Alb  2.7<L>  /  TBili  0.7  /  DBili  x   /  AST  20  /  ALT  21  /  AlkPhos  98  06-12      proBNP: Serum Pro-Brain Natriuretic Peptide: 15878 pg/mL (06-10-18 @ 13:32)      CARDIAC MARKERS:  Troponin T, Serum: 0.22 ng/mL (06-11 @ 06:04)  Troponin T, Serum: 0.26 ng/mL (06-10 @ 22:49)  Troponin T, Serum: 0.27 ng/mL (06-10 @ 13:32)          TELEMETRY: Sinus Rhythm/Demand Paced 	  	    PREVIOUS DIAGNOSTIC TESTING:      Echocardiogram:    < from: Transthoracic Echocardiogram (06.10.18 @ 13:13) >  Conclusions:  1. Tethered mitral valve leaflets with normal opening.  2. Calcified trileaflet aortic valve with normal opening.  3. Severe left ventricular enlargement.  4. Severe global left ventricular systolic dysfunction.  5. A device wire is noted in the right heart.  6. The right ventricle is not well visualized; grossly  preserved  right ventricular systolic function.    [ ]  Catheterization:  [ ] Stress Test:  	  	  ASSESSMENT/PLAN: 79 y/o man h/o Chronic Systolic HF s/p BiV AICD, DM, HTN, HLD, CAD s/p PCI, CVA, COPD on Home O2 who presents with NSTEMI    1) NSTEMI - no evidence of significant CAD progression (LCX is  and there is mild in-stent restenosis of LAD and RCA)  - continue DAPT (Aspirin/Clopidogrel), Atorvastatin 80 mg PO Daily, Carvedilol 12.5 mg PO Q12H  	        Horacio Roberts  Cardiology Fellow  Consult Fellow carries in-house phone [phone]) from 7:30 am - 5:00 pm M - F  For non-urgent issues outside of the above time period, please feel free to contact me directly by text or call at [phone] 24H hour events: No acute events overnight.     MEDICATIONS:  aspirin enteric coated 81 milliGRAM(s) Oral daily  carvedilol 12.5 milliGRAM(s) Oral every 12 hours  clopidogrel Tablet 75 milliGRAM(s) Oral daily  furosemide    Tablet 40 milliGRAM(s) Oral daily  heparin  Injectable 5000 Unit(s) SubCutaneous every 8 hours  sacubitril 49 mG/valsartan 51 mG 1 Tablet(s) Oral two times a day      tiotropium 18 MICROgram(s) Capsule 1 Capsule(s) Inhalation daily      loperamide 2 milliGRAM(s) Oral every 3 hours PRN    atorvastatin 80 milliGRAM(s) Oral at bedtime  insulin glargine Injectable (LANTUS) 7 Unit(s) SubCutaneous at bedtime  insulin lispro (HumaLOG) corrective regimen sliding scale   SubCutaneous at bedtime  insulin lispro (HumaLOG) corrective regimen sliding scale   SubCutaneous three times a day before meals    latanoprost 0.005% Ophthalmic Solution 1 Drop(s) Both EYES at bedtime      REVIEW OF SYSTEMS:  General: no fatigue/malaise, weight loss/gain.  Skin: no rashes.  Ophthalmologic: no blurred vision, no loss of vision. 	  ENT: no sore throat, rhinorrhea, sinus congestion.  Respiratory: no SOB, cough or wheeze.  Gastrointestinal:  no N/V/D, no melena/hematemesis/hematochezia.  Genitourinary: no dysuria/hesitancy or hematuria.  Musculoskeletal: no myalgias or arthralgias.  Neurological: no changes in vision or hearing, no lightheadedness/dizziness, no syncope/near syncope	  Psychiatric: no unusual stress/anxiety.   Hematology/Lymphatics: no unusual bleeding, bruising and no lymphadenopathy.  Endocrine: no unusual thirst.   All others negative except as stated above and in HPI.    PHYSICAL EXAM:  T(C): 36.9 (06-13-18 @ 06:51), Max: 36.9 (06-13-18 @ 06:51)  HR: 62 (06-13-18 @ 06:51) (58 - 68)  BP: 129/76 (06-13-18 @ 06:51) (112/63 - 160/92)  RR: 18 (06-13-18 @ 06:51) (12 - 23)  SpO2: 95% (06-13-18 @ 06:51) (92% - 96%)  Wt(kg): --  I&O's Summary    12 Jun 2018 07:01  -  13 Jun 2018 07:00  --------------------------------------------------------  IN: 1040 mL / OUT: 1385 mL / NET: -345 mL        Appearance: Normal	  HEENT:   Normal oral mucosa  Cardiovascular: RRR, No JVD, No edema  Respiratory: Lungs clear to auscultation	  Psychiatry: Mood & affect appropriate  Gastrointestinal:  Soft  Skin: No rashes, No ecchymoses, No cyanosis	  Neurologic: Non-focal  Extremities: No clubbing, cyanosis or edema  Vascular: Peripheral pulses palpable         LABS:	 	    CBC Full  -  ( 13 Jun 2018 05:09 )  WBC Count : 12.0 K/uL  Hemoglobin : 15.5 g/dL  Hematocrit : 46.8 %  Platelet Count - Automated : 147 K/uL  Mean Cell Volume : 97.5 fl  Mean Cell Hemoglobin : 32.3 pg  Mean Cell Hemoglobin Concentration : 33.1 gm/dL  Auto Neutrophil # : x  Auto Lymphocyte # : x  Auto Monocyte # : x  Auto Eosinophil # : x  Auto Basophil # : x  Auto Neutrophil % : x  Auto Lymphocyte % : x  Auto Monocyte % : x  Auto Eosinophil % : x  Auto Basophil % : x    06-13    142  |  103  |  23  ----------------------------<  101<H>  4.0   |  31  |  0.90  06-12    142  |  101  |  26<H>  ----------------------------<  126<H>  3.8   |  33<H>  |  0.92    Ca    8.4      13 Jun 2018 05:09  Ca    8.4      12 Jun 2018 14:36  Phos  2.5     06-13  Phos  2.6     06-12  Mg     2.0     06-13  Mg     1.9     06-12    TPro  5.2<L>  /  Alb  2.6<L>  /  TBili  0.8  /  DBili  x   /  AST  16  /  ALT  19  /  AlkPhos  99  06-13  TPro  5.2<L>  /  Alb  2.7<L>  /  TBili  0.7  /  DBili  x   /  AST  20  /  ALT  21  /  AlkPhos  98  06-12      proBNP: Serum Pro-Brain Natriuretic Peptide: 55125 pg/mL (06-10-18 @ 13:32)      CARDIAC MARKERS:  Troponin T, Serum: 0.22 ng/mL (06-11 @ 06:04)  Troponin T, Serum: 0.26 ng/mL (06-10 @ 22:49)  Troponin T, Serum: 0.27 ng/mL (06-10 @ 13:32)          TELEMETRY: Sinus Rhythm/Demand Paced 	  	    PREVIOUS DIAGNOSTIC TESTING:      Echocardiogram:    < from: Transthoracic Echocardiogram (06.10.18 @ 13:13) >  Conclusions:  1. Tethered mitral valve leaflets with normal opening.  2. Calcified trileaflet aortic valve with normal opening.  3. Severe left ventricular enlargement.  4. Severe global left ventricular systolic dysfunction.  5. A device wire is noted in the right heart.  6. The right ventricle is not well visualized; grossly  preserved  right ventricular systolic function.    	  ASSESSMENT/PLAN: 77 y/o man h/o Chronic Systolic HF s/p BiV AICD, DM, HTN, HLD, CAD s/p PCI, CVA, COPD on Home O2 who presents with NSTEMI    1) NSTEMI - no evidence of significant CAD progression (LCX is  and there is mild in-stent restenosis of LAD and RCA)  - continue DAPT (Aspirin/Clopidogrel), Atorvastatin 80 mg PO Daily, Carvedilol 12.5 mg PO Q12H  - no further need for additional inpatient cardiac testing, should follow up with his outpatient Cardiologist in 1 - 2 weeks	    2) Chronic Systolic HF - appears to be euvolemic on exam, would continue Furosemide 40 mg PO Daily  - continue BB as above, Sacubitril/Valsartan      Horacio Roberts  Cardiology Fellow  Consult Fellow carries in-house phone [phone]) from 7:30 am - 5:00 pm M - F  For non-urgent issues outside of the above time period, please feel free to contact me directly by text or call at [phone] 24H hour events: No acute events overnight.     MEDICATIONS:  aspirin enteric coated 81 milliGRAM(s) Oral daily  carvedilol 12.5 milliGRAM(s) Oral every 12 hours  clopidogrel Tablet 75 milliGRAM(s) Oral daily  furosemide    Tablet 40 milliGRAM(s) Oral daily  heparin  Injectable 5000 Unit(s) SubCutaneous every 8 hours  sacubitril 49 mG/valsartan 51 mG 1 Tablet(s) Oral two times a day    tiotropium 18 MICROgram(s) Capsule 1 Capsule(s) Inhalation daily    loperamide 2 milliGRAM(s) Oral every 3 hours PRN    atorvastatin 80 milliGRAM(s) Oral at bedtime  insulin glargine Injectable (LANTUS) 7 Unit(s) SubCutaneous at bedtime  insulin lispro (HumaLOG) corrective regimen sliding scale   SubCutaneous at bedtime  insulin lispro (HumaLOG) corrective regimen sliding scale   SubCutaneous three times a day before meals    latanoprost 0.005% Ophthalmic Solution 1 Drop(s) Both EYES at bedtime      REVIEW OF SYSTEMS:  General: no fatigue/malaise, weight loss/gain.  Skin: no rashes.  Ophthalmologic: no blurred vision, no loss of vision. 	  ENT: no sore throat, rhinorrhea, sinus congestion.  Respiratory: no SOB, cough or wheeze.  Gastrointestinal:  no N/V/D, no melena/hematemesis/hematochezia.  Genitourinary: no dysuria/hesitancy or hematuria.  Musculoskeletal: no myalgias or arthralgias.  Neurological: no changes in vision or hearing, no lightheadedness/dizziness, no syncope/near syncope	  Psychiatric: no unusual stress/anxiety.   Hematology/Lymphatics: no unusual bleeding, bruising and no lymphadenopathy.  Endocrine: no unusual thirst.   All others negative except as stated above and in HPI.    PHYSICAL EXAM:  T(C): 36.9 (06-13-18 @ 06:51), Max: 36.9 (06-13-18 @ 06:51)  HR: 62 (06-13-18 @ 06:51) (58 - 68)  BP: 129/76 (06-13-18 @ 06:51) (112/63 - 160/92)  RR: 18 (06-13-18 @ 06:51) (12 - 23)  SpO2: 95% (06-13-18 @ 06:51) (92% - 96%)  Wt(kg): --  I&O's Summary    12 Jun 2018 07:01  -  13 Jun 2018 07:00  --------------------------------------------------------  IN: 1040 mL / OUT: 1385 mL / NET: -345 mL        Appearance: Normal	  HEENT:   Normal oral mucosa  Cardiovascular: RRR, No JVD, No edema  Respiratory: Lungs clear to auscultation	  Psychiatry: Mood & affect appropriate  Gastrointestinal:  Soft  Skin: No rashes, No ecchymoses, No cyanosis	  Neurologic: Non-focal  Extremities: No clubbing, cyanosis or edema  Vascular: Peripheral pulses palpable         LABS:	 	    CBC Full  -  ( 13 Jun 2018 05:09 )  WBC Count : 12.0 K/uL  Hemoglobin : 15.5 g/dL  Hematocrit : 46.8 %  Platelet Count - Automated : 147 K/uL  Mean Cell Volume : 97.5 fl  Mean Cell Hemoglobin : 32.3 pg  Mean Cell Hemoglobin Concentration : 33.1 gm/dL  Auto Neutrophil # : x  Auto Lymphocyte # : x  Auto Monocyte # : x  Auto Eosinophil # : x  Auto Basophil # : x  Auto Neutrophil % : x  Auto Lymphocyte % : x  Auto Monocyte % : x  Auto Eosinophil % : x  Auto Basophil % : x    06-13    142  |  103  |  23  ----------------------------<  101<H>  4.0   |  31  |  0.90  06-12    142  |  101  |  26<H>  ----------------------------<  126<H>  3.8   |  33<H>  |  0.92    Ca    8.4      13 Jun 2018 05:09  Ca    8.4      12 Jun 2018 14:36  Phos  2.5     06-13  Phos  2.6     06-12  Mg     2.0     06-13  Mg     1.9     06-12    TPro  5.2<L>  /  Alb  2.6<L>  /  TBili  0.8  /  DBili  x   /  AST  16  /  ALT  19  /  AlkPhos  99  06-13  TPro  5.2<L>  /  Alb  2.7<L>  /  TBili  0.7  /  DBili  x   /  AST  20  /  ALT  21  /  AlkPhos  98  06-12      proBNP: Serum Pro-Brain Natriuretic Peptide: 74290 pg/mL (06-10-18 @ 13:32)      CARDIAC MARKERS:  Troponin T, Serum: 0.22 ng/mL (06-11 @ 06:04)  Troponin T, Serum: 0.26 ng/mL (06-10 @ 22:49)  Troponin T, Serum: 0.27 ng/mL (06-10 @ 13:32)          TELEMETRY: Sinus Rhythm/Demand Paced 	  	    PREVIOUS DIAGNOSTIC TESTING:      Echocardiogram:    < from: Transthoracic Echocardiogram (06.10.18 @ 13:13) >  Conclusions:  1. Tethered mitral valve leaflets with normal opening.  2. Calcified trileaflet aortic valve with normal opening.  3. Severe left ventricular enlargement.  4. Severe global left ventricular systolic dysfunction.  5. A device wire is noted in the right heart.  6. The right ventricle is not well visualized; grossly  preserved  right ventricular systolic function.    	  ASSESSMENT/PLAN: 79 y/o man h/o Chronic Systolic HF s/p BiV AICD, DM, HTN, HLD, CAD s/p PCI, CVA, COPD on Home O2 who presents with NSTEMI    1) NSTEMI - no evidence of significant CAD progression (LCX is  and there is mild in-stent restenosis of LAD and RCA)  - continue DAPT (Aspirin/Clopidogrel), Atorvastatin 80 mg PO Daily, Carvedilol 12.5 mg PO Q12H  - no further need for additional inpatient cardiac testing, should follow up with his outpatient Cardiologist in 1 - 2 weeks	    2) Chronic Systolic HF - appears to be euvolemic on exam, would continue Furosemide 40 mg PO Daily  - continue BB as above, Sacubitril/Valsartan      Horacio Roberts  Cardiology Fellow  Consult Fellow carries in-house phone ([phone]) from 7:30 am - 5:00 pm M - F  For non-urgent issues outside of the above time period, please feel free to contact me directly by text or call at [phone]

## 2018-06-13 NOTE — PROGRESS NOTE ADULT - ASSESSMENT
79 yo M PMH CHF (EF 25-30% in 10/16) s/p L sided BiV ICD, DM, HTN, CAD s/p 9 stents, CVA, COPD on 2L supplemental O2 at home, HLD transferred from [facility] for concern of NSTEMI with an elevated troponin, EKG changes from prior, and CHF exacerbation.    -  2. CHF  -TTE with severely reduced EF and LV dysfunction.  -Patient is net negative 3.8.  -Strict Is and Os  -BNP >04803  -CXR clear, but with pleural effusions on echo noted. Crackles improving. Ankle edema improving. WOB much improved, now on supplemental O2  -C/w Lasix 40mg QD, Entresto, Coreg    3. HTN  - Continue carvedilol and entresto    #Respiratory  1. Tachypnea  -Likely in setting of CHF exacerbation & NSTEMI. Much improved, RR 16-20 overnight.  -BiPAP and Lasix  -On supplemental O2    #GI:  -Consistent carb diet    #Endocrine:   1. DM  -On lantus 15u qhs at home, started on 7U qhs.   -ISS  -Consistent carbohydrate diet    #Heme:  -No active issues    #ID:  -No active issues    #DVT prophylaxis:  -heparin     #Advanced Directives:  -Pt filled out MOLST. Wants compressions and trial of intubation. Also amenable to pressors, abx, and blood products 77 yo M PMH CHF (EF 25-30% in 10/16) s/p L sided BiV ICD, DM, HTN, CAD s/p 9 stents, CVA, COPD on 2L supplemental O2 at home, HLD transferred from [facility] for concern of NSTEMI with an elevated troponin, EKG changes from prior, and CHF exacerbation.

## 2018-06-13 NOTE — PROGRESS NOTE ADULT - SUBJECTIVE AND OBJECTIVE BOX
Patient is a 78y old  Male who presents with a chief complaint of transfer from [facility].    SUBJECTIVE / OVERNIGHT EVENTS:  Sitting in bed, c/o watery diarrhea, no chest pain but exertional SOB. Tele- no acute event, Vitals stable      MEDICATIONS  (STANDING):  aspirin enteric coated 81 milliGRAM(s) Oral daily  atorvastatin 80 milliGRAM(s) Oral at bedtime  carvedilol 12.5 milliGRAM(s) Oral every 12 hours  clopidogrel Tablet 75 milliGRAM(s) Oral daily  furosemide    Tablet 40 milliGRAM(s) Oral daily  heparin  Injectable 5000 Unit(s) SubCutaneous every 8 hours  insulin glargine Injectable (LANTUS) 7 Unit(s) SubCutaneous at bedtime  insulin lispro (HumaLOG) corrective regimen sliding scale   SubCutaneous at bedtime  insulin lispro (HumaLOG) corrective regimen sliding scale   SubCutaneous three times a day before meals  lactobacillus acidophilus 1 Tablet(s) Oral daily  latanoprost 0.005% Ophthalmic Solution 1 Drop(s) Both EYES at bedtime  sacubitril 49 mG/valsartan 51 mG 1 Tablet(s) Oral two times a day  tiotropium 18 MICROgram(s) Capsule 1 Capsule(s) Inhalation daily    MEDICATIONS  (PRN):  loperamide 2 milliGRAM(s) Oral every 3 hours PRN Diarrhea      Vital Signs Last 24 Hrs  T(C): 36.5 (13 Jun 2018 13:25), Max: 36.9 (13 Jun 2018 06:51)  T(F): 97.7 (13 Jun 2018 13:25), Max: 98.4 (13 Jun 2018 06:51)  HR: 59 (13 Jun 2018 13:25) (58 - 68)  BP: 151/86 (13 Jun 2018 13:25) (114/51 - 160/92)  BP(mean): 90 (13 Jun 2018 04:00) (69 - 131)  RR: 18 (13 Jun 2018 13:25) (12 - 23)  SpO2: 95% (13 Jun 2018 13:25) (93% - 96%)  CAPILLARY BLOOD GLUCOSE      POCT Blood Glucose.: 200 mg/dL (13 Jun 2018 13:03)  POCT Blood Glucose.: 109 mg/dL (13 Jun 2018 09:00)  POCT Blood Glucose.: 176 mg/dL (12 Jun 2018 21:13)  POCT Blood Glucose.: 191 mg/dL (12 Jun 2018 17:23)    I&O's Summary    12 Jun 2018 07:01  -  13 Jun 2018 07:00  --------------------------------------------------------  IN: 1040 mL / OUT: 1385 mL / NET: -345 mL    13 Jun 2018 07:01  -  13 Jun 2018 17:07  --------------------------------------------------------  IN: 380 mL / OUT: 680 mL / NET: -300 mL        PHYSICAL EXAM:-  GENERAL: NAD, well-developed  EYES: EOMI, PERRLA, conjunctiva and sclera clear  NECK: Supple, No JVD, no thyromegaly  CHEST/LUNG: Clear to auscultation bilaterally; No wheeze  HEART: Regular rate and rhythm; S1, S2 audible, No murmurs, rubs, or gallops  ABDOMEN: Soft, Nontender, Nondistended; Bowel sounds present  EXTREMITIES:  2+ Peripheral Pulses, No clubbing, cyanosis, or edema  NEURO: AAOx3, no focal deficit      LABS:                        15.5   12.0  )-----------( 147      ( 13 Jun 2018 05:09 )             46.8     06-13    142  |  103  |  23  ----------------------------<  101<H>  4.0   |  31  |  0.90    Ca    8.4      13 Jun 2018 05:09  Phos  2.5     06-13  Mg     2.0     06-13    TPro  5.2<L>  /  Alb  2.6<L>  /  TBili  0.8  /  DBili  x   /  AST  16  /  ALT  19  /  AlkPhos  99  06-13    PT/INR - ( 13 Jun 2018 05:09 )   PT: 11.3 sec;   INR: 1.04 ratio         PTT - ( 13 Jun 2018 05:09 )  PTT:30.0 sec    RADIOLOGY & ADDITIONAL TESTS:    Imaging Personally Reviewed: CXR, Cath, echo  Consultant(s) Notes Reviewed:  cardiology  Care Discussed with Consultants/Other Providers: cardiology

## 2018-06-14 ENCOUNTER — TRANSCRIPTION ENCOUNTER (OUTPATIENT)
Age: 78
End: 2018-06-14

## 2018-06-14 VITALS — WEIGHT: 144.4 LBS

## 2018-06-14 LAB
ANION GAP SERPL CALC-SCNC: 11 MMOL/L — SIGNIFICANT CHANGE UP (ref 5–17)
BUN SERPL-MCNC: 23 MG/DL — SIGNIFICANT CHANGE UP (ref 7–23)
CALCIUM SERPL-MCNC: 8.1 MG/DL — LOW (ref 8.4–10.5)
CHLORIDE SERPL-SCNC: 101 MMOL/L — SIGNIFICANT CHANGE UP (ref 96–108)
CO2 SERPL-SCNC: 30 MMOL/L — SIGNIFICANT CHANGE UP (ref 22–31)
CREAT SERPL-MCNC: 0.86 MG/DL — SIGNIFICANT CHANGE UP (ref 0.5–1.3)
FERRITIN SERPL-MCNC: 118 NG/ML — SIGNIFICANT CHANGE UP (ref 30–400)
FOLATE SERPL-MCNC: 8.5 NG/ML — SIGNIFICANT CHANGE UP
GLUCOSE BLDC GLUCOMTR-MCNC: 111 MG/DL — HIGH (ref 70–99)
GLUCOSE BLDC GLUCOMTR-MCNC: 122 MG/DL — HIGH (ref 70–99)
GLUCOSE BLDC GLUCOMTR-MCNC: 163 MG/DL — HIGH (ref 70–99)
GLUCOSE SERPL-MCNC: 130 MG/DL — HIGH (ref 70–99)
HCT VFR BLD CALC: 43.3 % — SIGNIFICANT CHANGE UP (ref 39–50)
HGB BLD-MCNC: 13.9 G/DL — SIGNIFICANT CHANGE UP (ref 13–17)
IRON SATN MFR SERPL: 102 UG/DL — SIGNIFICANT CHANGE UP (ref 45–165)
IRON SATN MFR SERPL: 46 % — SIGNIFICANT CHANGE UP (ref 16–55)
MCHC RBC-ENTMCNC: 30.3 PG — SIGNIFICANT CHANGE UP (ref 27–34)
MCHC RBC-ENTMCNC: 32.1 GM/DL — SIGNIFICANT CHANGE UP (ref 32–36)
MCV RBC AUTO: 94.3 FL — SIGNIFICANT CHANGE UP (ref 80–100)
OB PNL STL: POSITIVE
PLATELET # BLD AUTO: 144 K/UL — LOW (ref 150–400)
POTASSIUM SERPL-MCNC: 3.6 MMOL/L — SIGNIFICANT CHANGE UP (ref 3.5–5.3)
POTASSIUM SERPL-SCNC: 3.6 MMOL/L — SIGNIFICANT CHANGE UP (ref 3.5–5.3)
RBC # BLD: 4.59 M/UL — SIGNIFICANT CHANGE UP (ref 4.2–5.8)
RBC # FLD: 14.6 % — HIGH (ref 10.3–14.5)
SODIUM SERPL-SCNC: 142 MMOL/L — SIGNIFICANT CHANGE UP (ref 135–145)
TIBC SERPL-MCNC: 221 UG/DL — SIGNIFICANT CHANGE UP (ref 220–430)
UIBC SERPL-MCNC: 119 UG/DL — SIGNIFICANT CHANGE UP (ref 110–370)
VIT B12 SERPL-MCNC: 1640 PG/ML — HIGH (ref 232–1245)
WBC # BLD: 10.35 K/UL — SIGNIFICANT CHANGE UP (ref 3.8–10.5)
WBC # FLD AUTO: 10.35 K/UL — SIGNIFICANT CHANGE UP (ref 3.8–10.5)

## 2018-06-14 PROCEDURE — 86256 FLUORESCENT ANTIBODY TITER: CPT

## 2018-06-14 PROCEDURE — 84302 ASSAY OF SWEAT SODIUM: CPT

## 2018-06-14 PROCEDURE — 82435 ASSAY OF BLOOD CHLORIDE: CPT

## 2018-06-14 PROCEDURE — 82947 ASSAY GLUCOSE BLOOD QUANT: CPT

## 2018-06-14 PROCEDURE — 83036 HEMOGLOBIN GLYCOSYLATED A1C: CPT

## 2018-06-14 PROCEDURE — 80053 COMPREHEN METABOLIC PANEL: CPT

## 2018-06-14 PROCEDURE — 93306 TTE W/DOPPLER COMPLETE: CPT

## 2018-06-14 PROCEDURE — 86901 BLOOD TYPING SEROLOGIC RH(D): CPT

## 2018-06-14 PROCEDURE — C1894: CPT

## 2018-06-14 PROCEDURE — 80061 LIPID PANEL: CPT

## 2018-06-14 PROCEDURE — 83735 ASSAY OF MAGNESIUM: CPT

## 2018-06-14 PROCEDURE — 85027 COMPLETE CBC AUTOMATED: CPT

## 2018-06-14 PROCEDURE — 86258 DGP ANTIBODY EACH IG CLASS: CPT

## 2018-06-14 PROCEDURE — 82550 ASSAY OF CK (CPK): CPT

## 2018-06-14 PROCEDURE — 74177 CT ABD & PELVIS W/CONTRAST: CPT

## 2018-06-14 PROCEDURE — 74177 CT ABD & PELVIS W/CONTRAST: CPT | Mod: 26

## 2018-06-14 PROCEDURE — 82653 EL-1 FECAL QUANTITATIVE: CPT

## 2018-06-14 PROCEDURE — 87329 GIARDIA AG IA: CPT

## 2018-06-14 PROCEDURE — 83880 ASSAY OF NATRIURETIC PEPTIDE: CPT

## 2018-06-14 PROCEDURE — 87046 STOOL CULTR AEROBIC BACT EA: CPT

## 2018-06-14 PROCEDURE — C1769: CPT

## 2018-06-14 PROCEDURE — 86850 RBC ANTIBODY SCREEN: CPT

## 2018-06-14 PROCEDURE — 82553 CREATINE MB FRACTION: CPT

## 2018-06-14 PROCEDURE — C1887: CPT

## 2018-06-14 PROCEDURE — 82746 ASSAY OF FOLIC ACID SERUM: CPT

## 2018-06-14 PROCEDURE — 82607 VITAMIN B-12: CPT

## 2018-06-14 PROCEDURE — 71045 X-RAY EXAM CHEST 1 VIEW: CPT

## 2018-06-14 PROCEDURE — 84597 ASSAY OF VITAMIN K: CPT

## 2018-06-14 PROCEDURE — 84999 UNLISTED CHEMISTRY PROCEDURE: CPT

## 2018-06-14 PROCEDURE — 84132 ASSAY OF SERUM POTASSIUM: CPT

## 2018-06-14 PROCEDURE — 87449 NOS EACH ORGANISM AG IA: CPT

## 2018-06-14 PROCEDURE — 86231 EMA EACH IG CLASS: CPT

## 2018-06-14 PROCEDURE — 83605 ASSAY OF LACTIC ACID: CPT

## 2018-06-14 PROCEDURE — 83521 IG LIGHT CHAINS FREE EACH: CPT

## 2018-06-14 PROCEDURE — 99152 MOD SED SAME PHYS/QHP 5/>YRS: CPT

## 2018-06-14 PROCEDURE — 82728 ASSAY OF FERRITIN: CPT

## 2018-06-14 PROCEDURE — 85730 THROMBOPLASTIN TIME PARTIAL: CPT

## 2018-06-14 PROCEDURE — 87324 CLOSTRIDIUM AG IA: CPT

## 2018-06-14 PROCEDURE — 82272 OCCULT BLD FECES 1-3 TESTS: CPT

## 2018-06-14 PROCEDURE — 82330 ASSAY OF CALCIUM: CPT

## 2018-06-14 PROCEDURE — 93005 ELECTROCARDIOGRAM TRACING: CPT

## 2018-06-14 PROCEDURE — 87045 FECES CULTURE AEROBIC BACT: CPT

## 2018-06-14 PROCEDURE — 83550 IRON BINDING TEST: CPT

## 2018-06-14 PROCEDURE — 84484 ASSAY OF TROPONIN QUANT: CPT

## 2018-06-14 PROCEDURE — 94660 CPAP INITIATION&MGMT: CPT

## 2018-06-14 PROCEDURE — 84100 ASSAY OF PHOSPHORUS: CPT

## 2018-06-14 PROCEDURE — 99239 HOSP IP/OBS DSCHRG MGMT >30: CPT

## 2018-06-14 PROCEDURE — 85014 HEMATOCRIT: CPT

## 2018-06-14 PROCEDURE — 86900 BLOOD TYPING SEROLOGIC ABO: CPT

## 2018-06-14 PROCEDURE — 83993 ASSAY FOR CALPROTECTIN FECAL: CPT

## 2018-06-14 PROCEDURE — 82962 GLUCOSE BLOOD TEST: CPT

## 2018-06-14 PROCEDURE — 99153 MOD SED SAME PHYS/QHP EA: CPT

## 2018-06-14 PROCEDURE — 94640 AIRWAY INHALATION TREATMENT: CPT

## 2018-06-14 PROCEDURE — 82705 FATS/LIPIDS FECES QUAL: CPT

## 2018-06-14 PROCEDURE — 82803 BLOOD GASES ANY COMBINATION: CPT

## 2018-06-14 PROCEDURE — 99233 SBSQ HOSP IP/OBS HIGH 50: CPT | Mod: GC

## 2018-06-14 PROCEDURE — 93458 L HRT ARTERY/VENTRICLE ANGIO: CPT

## 2018-06-14 PROCEDURE — 84443 ASSAY THYROID STIM HORMONE: CPT

## 2018-06-14 PROCEDURE — 84295 ASSAY OF SERUM SODIUM: CPT

## 2018-06-14 PROCEDURE — 85610 PROTHROMBIN TIME: CPT

## 2018-06-14 PROCEDURE — 82380 ASSAY OF CAROTENE: CPT

## 2018-06-14 PROCEDURE — 87177 OVA AND PARASITES SMEARS: CPT

## 2018-06-14 PROCEDURE — 86364 TISS TRNSGLTMNASE EA IG CLAS: CPT

## 2018-06-14 PROCEDURE — 80048 BASIC METABOLIC PNL TOTAL CA: CPT

## 2018-06-14 RX ORDER — SACUBITRIL AND VALSARTAN 24; 26 MG/1; MG/1
2 TABLET, FILM COATED ORAL
Qty: 0 | Refills: 0 | COMMUNITY

## 2018-06-14 RX ORDER — FUROSEMIDE 40 MG
1 TABLET ORAL
Qty: 30 | Refills: 0 | OUTPATIENT
Start: 2018-06-14 | End: 2018-07-13

## 2018-06-14 RX ORDER — SACUBITRIL AND VALSARTAN 24; 26 MG/1; MG/1
1 TABLET, FILM COATED ORAL
Qty: 0 | Refills: 0 | COMMUNITY

## 2018-06-14 RX ORDER — TICAGRELOR 90 MG/1
1 TABLET ORAL
Qty: 60 | Refills: 0 | OUTPATIENT
Start: 2018-06-14 | End: 2018-07-13

## 2018-06-14 RX ORDER — ISOSORBIDE MONONITRATE 60 MG/1
1 TABLET, EXTENDED RELEASE ORAL
Qty: 0 | Refills: 0 | COMMUNITY

## 2018-06-14 RX ORDER — MOMETASONE FUROATE 220 UG/1
2 INHALANT RESPIRATORY (INHALATION)
Qty: 0 | Refills: 0 | COMMUNITY

## 2018-06-14 RX ORDER — FUROSEMIDE 40 MG
1 TABLET ORAL
Qty: 0 | Refills: 0 | COMMUNITY

## 2018-06-14 RX ORDER — SACUBITRIL AND VALSARTAN 24; 26 MG/1; MG/1
2 TABLET, FILM COATED ORAL
Qty: 0 | Refills: 0 | Status: DISCONTINUED | OUTPATIENT
Start: 2018-06-14 | End: 2018-06-14

## 2018-06-14 RX ORDER — CLOPIDOGREL BISULFATE 75 MG/1
75 TABLET, FILM COATED ORAL DAILY
Qty: 0 | Refills: 0 | Status: DISCONTINUED | OUTPATIENT
Start: 2018-06-14 | End: 2018-06-14

## 2018-06-14 RX ORDER — GABAPENTIN 400 MG/1
0.5 CAPSULE ORAL
Qty: 0 | Refills: 0 | COMMUNITY

## 2018-06-14 RX ORDER — PRASUGREL 5 MG/1
10 TABLET, FILM COATED ORAL DAILY
Qty: 0 | Refills: 0 | Status: DISCONTINUED | OUTPATIENT
Start: 2018-06-14 | End: 2018-06-14

## 2018-06-14 RX ORDER — LACTOBACILLUS ACIDOPHILUS 100MM CELL
1 CAPSULE ORAL
Qty: 30 | Refills: 0 | OUTPATIENT
Start: 2018-06-14 | End: 2018-07-13

## 2018-06-14 RX ADMIN — CARVEDILOL PHOSPHATE 12.5 MILLIGRAM(S): 80 CAPSULE, EXTENDED RELEASE ORAL at 05:46

## 2018-06-14 RX ADMIN — SACUBITRIL AND VALSARTAN 1 TABLET(S): 24; 26 TABLET, FILM COATED ORAL at 05:46

## 2018-06-14 RX ADMIN — HEPARIN SODIUM 5000 UNIT(S): 5000 INJECTION INTRAVENOUS; SUBCUTANEOUS at 05:46

## 2018-06-14 RX ADMIN — CARVEDILOL PHOSPHATE 12.5 MILLIGRAM(S): 80 CAPSULE, EXTENDED RELEASE ORAL at 17:40

## 2018-06-14 RX ADMIN — Medication 81 MILLIGRAM(S): at 13:29

## 2018-06-14 RX ADMIN — SACUBITRIL AND VALSARTAN 2 TABLET(S): 24; 26 TABLET, FILM COATED ORAL at 17:40

## 2018-06-14 RX ADMIN — TIOTROPIUM BROMIDE 1 CAPSULE(S): 18 CAPSULE ORAL; RESPIRATORY (INHALATION) at 17:39

## 2018-06-14 RX ADMIN — HEPARIN SODIUM 5000 UNIT(S): 5000 INJECTION INTRAVENOUS; SUBCUTANEOUS at 13:29

## 2018-06-14 RX ADMIN — Medication 1 TABLET(S): at 13:29

## 2018-06-14 RX ADMIN — CLOPIDOGREL BISULFATE 75 MILLIGRAM(S): 75 TABLET, FILM COATED ORAL at 13:29

## 2018-06-14 RX ADMIN — Medication 40 MILLIGRAM(S): at 05:45

## 2018-06-14 NOTE — PROGRESS NOTE ADULT - PROBLEM SELECTOR PLAN 7
No wheezing, fever, cough  - c/w bronchodilators, Spiriva
No wheezing, fever, cough  - c/w bronchodilators, Spiriva

## 2018-06-14 NOTE — PROGRESS NOTE ADULT - PROBLEM SELECTOR PLAN 3
Chronic diarrhea with significant weight loss. Having watery diarrhea( non bloody) and weight loss for last 6 months. Never had Colonoscopy in the past. No abdominal pain, N/V. Afebrile, no recent travel.   - Appreciated GI consult  -  stool osm, Na and K to calculate osmolar gap ordered.  - GI ordered stool for calprotectin, occult blood, giardia ag, sudan stain for fecal fat, elastase  celiac panel, Vit K, B12, folate, iron studies, beta carotene  - CT scan abd/pelvis with contrast  - per GI he might need flex sig with bx r/o microscopic colitis and amyloid,     He is at high risk for anesthesia and full colonoscopy-pt has AICD  - c/w lomotil prn  - If GI clears will d/c him home and outpatient w/u can be done
He has been having on and off watery diarrhea and weight loss for last 6 moths. Never had Colonoscopy in the past. No abdominal pain, n/V. Afebrile, no recent travel.   -Stoool c/s negative. stool O+p ordered. has been on imodium  - GI consult called from Dr Blaine Lezama

## 2018-06-14 NOTE — DISCHARGE NOTE ADULT - MEDICATION SUMMARY - MEDICATIONS TO TAKE
I will START or STAY ON the medications listed below when I get home from the hospital:    spironolactone 25 mg oral tablet  -- 1 tab(s) by mouth once a day  -- Indication: For Hypertension    aspirin 81 mg oral delayed release tablet  -- 1 tab(s) by mouth once a day  -- Indication: For CAD (coronary artery disease)    Entresto 49 mg-51 mg oral tablet  -- 2 tab(s) by mouth 2 times a day  -- Indication: For CHF (congestive heart failure)    isosorbide mononitrate 30 mg oral tablet, extended release  -- 1 tab(s) by mouth once a day (in the morning)  -- Indication: For CHF (congestive heart failure)    gabapentin 600 mg oral tablet  -- 0.5 tab(s) by mouth 2 times a day  -- Indication: For Neuropathy    insulin glargine  -- 7 unit(s) subcutaneous once a day  -- Indication: For diabetes     glimepiride 4 mg oral tablet  -- 1 tab(s) by mouth once a day  -- Indication: For diabetes     Lipitor 80 mg oral tablet  -- 1 tab(s) by mouth once a day (at bedtime)  -- Indication: For Hld     Brilinta (ticagrelor) 90 mg oral tablet  -- 1 tab(s) by mouth 2 times a day   please follow with cardiolgoist reccomendations.   -- It is very important that you take or use this exactly as directed.  Do not skip doses or discontinue unless directed by your doctor.  Obtain medical advice before taking any non-prescription drugs as some may affect the action of this medication.    -- Indication: For CAD (coronary artery disease)    carvedilol 12.5 mg oral tablet  -- 1 tab(s) by mouth every 12 hours  -- Indication: For Htn     Spiriva 18 mcg inhalation capsule  -- 1 each inhaled once a day  -- Indication: For respiratory     furosemide 40 mg oral tablet  -- 1 tab(s) by mouth once a day  -- Indication: For CHF (congestive heart failure)    latanoprost 0.005% ophthalmic solution  --  to each affected eye   -- Indication: For eye drop     lactobacillus acidophilus oral capsule  -- 1 tab(s) by mouth once a day   -- Indication: For acidophilus     mometasone  -- 2 puff(s) inhaled once a day  -- Indication: For respiratory

## 2018-06-14 NOTE — DISCHARGE NOTE ADULT - SECONDARY DIAGNOSIS.
CHF (congestive heart failure) Watery diarrhea CAD (coronary artery disease) Type 2 diabetes mellitus Chronic obstructive pulmonary disease Hypertension

## 2018-06-14 NOTE — DISCHARGE NOTE ADULT - CARE PROVIDER_API CALL
Lashonda Jeter), Adv [facility]; Cardiology; Internal Medicine  [address] [place] [address]  Phone: [phone]  Fax: [phone]

## 2018-06-14 NOTE — PROGRESS NOTE ADULT - SUBJECTIVE AND OBJECTIVE BOX
24H hour events:     MEDICATIONS:  aspirin enteric coated 81 milliGRAM(s) Oral daily  carvedilol 12.5 milliGRAM(s) Oral every 12 hours  clopidogrel Tablet 75 milliGRAM(s) Oral daily  furosemide    Tablet 40 milliGRAM(s) Oral daily  heparin  Injectable 5000 Unit(s) SubCutaneous every 8 hours  sacubitril 49 mG/valsartan 51 mG 1 Tablet(s) Oral two times a day      tiotropium 18 MICROgram(s) Capsule 1 Capsule(s) Inhalation daily      loperamide 2 milliGRAM(s) Oral every 3 hours PRN    atorvastatin 80 milliGRAM(s) Oral at bedtime  insulin glargine Injectable (LANTUS) 7 Unit(s) SubCutaneous at bedtime  insulin lispro (HumaLOG) corrective regimen sliding scale   SubCutaneous at bedtime  insulin lispro (HumaLOG) corrective regimen sliding scale   SubCutaneous three times a day before meals    latanoprost 0.005% Ophthalmic Solution 1 Drop(s) Both EYES at bedtime      REVIEW OF SYSTEMS:  General: no fatigue/malaise, weight loss/gain.  Skin: no rashes.  Ophthalmologic: no blurred vision, no loss of vision. 	  ENT: no sore throat, rhinorrhea, sinus congestion.  Respiratory: no SOB, cough or wheeze.  Gastrointestinal:  no N/V/D, no melena/hematemesis/hematochezia.  Genitourinary: no dysuria/hesitancy or hematuria.  Musculoskeletal: no myalgias or arthralgias.  Neurological: no changes in vision or hearing, no lightheadedness/dizziness, no syncope/near syncope	  Psychiatric: no unusual stress/anxiety.   Hematology/Lymphatics: no unusual bleeding, bruising and no lymphadenopathy.  Endocrine: no unusual thirst.   All others negative except as stated above and in HPI.    PHYSICAL EXAM:  T(C): 36.8 (06-14-18 @ 05:40), Max: 36.9 (06-13-18 @ 20:46)  HR: 68 (06-14-18 @ 05:40) (59 - 68)  BP: 133/79 (06-14-18 @ 05:40) (124/69 - 151/86)  RR: 18 (06-14-18 @ 05:40) (18 - 18)  SpO2: 95% (06-14-18 @ 05:40) (95% - 98%)  Wt(kg): --  I&O's Summary    13 Jun 2018 07:01  -  14 Jun 2018 07:00  --------------------------------------------------------  IN: 740 mL / OUT: 1080 mL / NET: -340 mL    14 Jun 2018 07:01  -  14 Jun 2018 10:06  --------------------------------------------------------  IN: 360 mL / OUT: 0 mL / NET: 360 mL        Appearance: Normal	  HEENT:   Normal oral mucosa  Cardiovascular: RRR, No JVD, No edema  Respiratory: Lungs clear to auscultation	  Psychiatry: Mood & affect appropriate  Gastrointestinal:  Soft  Skin: No rashes, No ecchymoses, No cyanosis	  Neurologic: Non-focal  Extremities: No clubbing, cyanosis or edema  Vascular: Peripheral pulses palpable         LABS:	 	    CBC Full  -  ( 14 Jun 2018 07:56 )  WBC Count : 10.35 K/uL  Hemoglobin : 13.9 g/dL  Hematocrit : 43.3 %  Platelet Count - Automated : 144 K/uL  Mean Cell Volume : 94.3 fl  Mean Cell Hemoglobin : 30.3 pg  Mean Cell Hemoglobin Concentration : 32.1 gm/dL  Auto Neutrophil # : x  Auto Lymphocyte # : x  Auto Monocyte # : x  Auto Eosinophil # : x  Auto Basophil # : x  Auto Neutrophil % : x  Auto Lymphocyte % : x  Auto Monocyte % : x  Auto Eosinophil % : x  Auto Basophil % : x    06-14    142  |  101  |  23  ----------------------------<  130<H>  3.6   |  30  |  0.86  06-13    142  |  103  |  23  ----------------------------<  101<H>  4.0   |  31  |  0.90    Ca    8.1<L>      14 Jun 2018 06:25  Ca    8.4      13 Jun 2018 05:09  Phos  2.5     06-13  Phos  2.6     06-12  Mg     2.0     06-13  Mg     1.9     06-12    TPro  5.2<L>  /  Alb  2.6<L>  /  TBili  0.8  /  DBili  x   /  AST  16  /  ALT  19  /  AlkPhos  99  06-13      proBNP: Serum Pro-Brain Natriuretic Peptide: 23598 pg/mL (06-10-18 @ 13:32)    Lipid Profile:   HgA1c:   TSH:       CARDIAC MARKERS:          TELEMETRY: 	    ECG:  	  RADIOLOGY:  OTHER: 	    PREVIOUS DIAGNOSTIC TESTING:    [ ] Echocardiogram:  [ ]  Catheterization:  [ ] Stress Test:  	  	  ASSESSMENT/PLAN: 79 y/o man h/o Chronic Systolic HF s/p BiV AICD, DM, HTN, HLD, CAD s/p PCI, CVA, COPD on Home O2 who presents with NSTEMI    1) NSTEMI - no evidence of significant CAD progression (LCX is  and there is mild in-stent restenosis of LAD and RCA)  - continue DAPT (Aspirin/Clopidogrel), Atorvastatin 80 mg PO Daily, Carvedilol 12.5 mg PO Q12H  - no further need for additional inpatient cardiac testing, should follow up with his outpatient Cardiologist in 1 - 2 weeks	    2) Chronic Systolic HF - appears to be euvolemic on exam, would continue Furosemide 40 mg PO Daily  - continue BB as above, Sacubitril/Valsartan  	        Horacio Roberts  Cardiology Fellow  Consult Fellow carries in-house phone [phone]) from 7:30 am - 5:00 pm M - F  For non-urgent issues outside of the above time period, please feel free to contact me directly by text or call at [phone] 24H hour events: No acute events overnight.     MEDICATIONS:  aspirin enteric coated 81 milliGRAM(s) Oral daily  carvedilol 12.5 milliGRAM(s) Oral every 12 hours  clopidogrel Tablet 75 milliGRAM(s) Oral daily  furosemide    Tablet 40 milliGRAM(s) Oral daily  heparin  Injectable 5000 Unit(s) SubCutaneous every 8 hours  sacubitril 49 mG/valsartan 51 mG 1 Tablet(s) Oral two times a day      tiotropium 18 MICROgram(s) Capsule 1 Capsule(s) Inhalation daily      loperamide 2 milliGRAM(s) Oral every 3 hours PRN    atorvastatin 80 milliGRAM(s) Oral at bedtime  insulin glargine Injectable (LANTUS) 7 Unit(s) SubCutaneous at bedtime  insulin lispro (HumaLOG) corrective regimen sliding scale   SubCutaneous at bedtime  insulin lispro (HumaLOG) corrective regimen sliding scale   SubCutaneous three times a day before meals    latanoprost 0.005% Ophthalmic Solution 1 Drop(s) Both EYES at bedtime      REVIEW OF SYSTEMS:  General: no fatigue/malaise, weight loss/gain.  Skin: no rashes.  Ophthalmologic: no blurred vision, no loss of vision. 	  ENT: no sore throat, rhinorrhea, sinus congestion.  Respiratory: no SOB, cough or wheeze.  Gastrointestinal:  no N/V/D, no melena/hematemesis/hematochezia.  Genitourinary: no dysuria/hesitancy or hematuria.  Musculoskeletal: no myalgias or arthralgias.  Neurological: no changes in vision or hearing, no lightheadedness/dizziness, no syncope/near syncope	  Psychiatric: no unusual stress/anxiety.   Hematology/Lymphatics: no unusual bleeding, bruising and no lymphadenopathy.  Endocrine: no unusual thirst.   All others negative except as stated above and in HPI.    PHYSICAL EXAM:  T(C): 36.8 (06-14-18 @ 05:40), Max: 36.9 (06-13-18 @ 20:46)  HR: 68 (06-14-18 @ 05:40) (59 - 68)  BP: 133/79 (06-14-18 @ 05:40) (124/69 - 151/86)  RR: 18 (06-14-18 @ 05:40) (18 - 18)  SpO2: 95% (06-14-18 @ 05:40) (95% - 98%)  Wt(kg): --  I&O's Summary    13 Jun 2018 07:01  -  14 Jun 2018 07:00  --------------------------------------------------------  IN: 740 mL / OUT: 1080 mL / NET: -340 mL    14 Jun 2018 07:01  -  14 Jun 2018 10:06  --------------------------------------------------------  IN: 360 mL / OUT: 0 mL / NET: 360 mL        Appearance: Normal	  HEENT:   Normal oral mucosa  Cardiovascular: RRR, No JVD, No edema  Respiratory: Lungs clear to auscultation	  Psychiatry: Mood & affect appropriate  Gastrointestinal:  Soft  Skin: No rashes, No ecchymoses, No cyanosis	  Neurologic: Non-focal  Extremities: No clubbing, cyanosis or edema  Vascular: Peripheral pulses palpable         LABS:	 	    CBC Full  -  ( 14 Jun 2018 07:56 )  WBC Count : 10.35 K/uL  Hemoglobin : 13.9 g/dL  Hematocrit : 43.3 %  Platelet Count - Automated : 144 K/uL  Mean Cell Volume : 94.3 fl  Mean Cell Hemoglobin : 30.3 pg  Mean Cell Hemoglobin Concentration : 32.1 gm/dL  Auto Neutrophil # : x  Auto Lymphocyte # : x  Auto Monocyte # : x  Auto Eosinophil # : x  Auto Basophil # : x  Auto Neutrophil % : x  Auto Lymphocyte % : x  Auto Monocyte % : x  Auto Eosinophil % : x  Auto Basophil % : x    06-14    142  |  101  |  23  ----------------------------<  130<H>  3.6   |  30  |  0.86  06-13    142  |  103  |  23  ----------------------------<  101<H>  4.0   |  31  |  0.90    Ca    8.1<L>      14 Jun 2018 06:25  Ca    8.4      13 Jun 2018 05:09  Phos  2.5     06-13  Phos  2.6     06-12  Mg     2.0     06-13  Mg     1.9     06-12    TPro  5.2<L>  /  Alb  2.6<L>  /  TBili  0.8  /  DBili  x   /  AST  16  /  ALT  19  /  AlkPhos  99  06-13      proBNP: Serum Pro-Brain Natriuretic Peptide: 43587 pg/mL (06-10-18 @ 13:32)      	  ASSESSMENT/PLAN: 79 y/o man h/o Chronic Systolic HF s/p BiV AICD, DM, HTN, HLD, CAD s/p PCI, CVA, COPD on Home O2 who presents with NSTEMI    1) NSTEMI - no evidence of significant CAD progression (LCX is  and there is mild in-stent restenosis of LAD and RCA)  - continue DAPT (Aspirin/Clopidogrel), Atorvastatin 80 mg PO Daily, Carvedilol 12.5 mg PO Q12H  - no further need for additional inpatient cardiac testing, should follow up with his outpatient Cardiologist in 1 - 2 weeks	    2) Chronic Systolic HF - appears to be euvolemic on exam, would continue Furosemide 40 mg PO Daily  - continue BB as above, Sacubitril/Valsartan  	        Horacio Roberts  Cardiology Fellow  Consult Fellow carries in-house phone [phone]) from 7:30 am - 5:00 pm M - F  For non-urgent issues outside of the above time period, please feel free to contact me directly by text or call at [phone]

## 2018-06-14 NOTE — PROGRESS NOTE ADULT - ASSESSMENT
pt had normal bm today (the first in three months) .  stool tests sent.  recommend follow up in office with Dr. Lezama.

## 2018-06-14 NOTE — DISCHARGE NOTE ADULT - CARE PLAN
Principal Discharge DX:	NSTEMI (non-ST elevated myocardial infarction)  Goal:	stable  Assessment and plan of treatment:	s/p angiogram - no significant evidence of CAD progression s/p PCI (lcx is  and there is mild in stent restenosis of LAD AND RCA)  Secondary Diagnosis:	CHF (congestive heart failure)  Goal:	stable  Assessment and plan of treatment:	Weigh yourself daily.  If you gain 3lbs in 3 days, or 5lbs in a week call your Health Care Provider.  Do not eat or drink foods containing more than 2000mg of salt (sodium) in your diet every day.  Call your Health Care Provider if you have any swelling or increased swelling in your feet, ankles, and/or stomach.  Take all of your medication as directed.  If you become dizzy call your Health Care Provider.  Secondary Diagnosis:	Watery diarrhea  Goal:	stable  Assessment and plan of treatment:	ct abd/pelvis - negative diverticulosis without diverticulitis.  Secondary Diagnosis:	CAD (coronary artery disease)  Goal:	stable  Assessment and plan of treatment:	Coronary artery disease is a condition where the arteries the supply the heart muscle get clogges with fatty deposits & puts you at risk for a heart attack  Call your doctor if you have any new pain, pressure, or discomfort in the center of your chest, pain, tingling or discomfort in arms, back, neck, jaw, or stomach, shortness of breath, nausea, vomiting, burping or heartburn, sweating, cold and clammy skin, racing or abnormal heartbeat for more than 10 minutes or if they keep coming & going.  Call 911 and do not tr to get to hospital by care  You can help yourself with lefestyle changes (quitting smoking if you smoke), eat lots of fruits & vegetables & low fat dairy products, not a lot of meat & fatty foods, walk or some form of physical activity most days of the week, lose weight if you are overweight  Take your cardiac medication as prescribed to lower cholesterol, to lower blood pressure, aspirin to prevent blood clots, and diabetes control  Make sure to keep appointments with doctor for cardiac follow up care  Secondary Diagnosis:	Type 2 diabetes mellitus  Goal:	stable  Assessment and plan of treatment:	HgA1C this admission.  Make sure you get your HgA1c checked every three months.  If you take oral diabetes medications, check your blood glucose two times a day.  If you take insulin, check your blood glucose before meals and at bedtime.  It's important not to skip any meals.  Keep a log of your blood glucose results and always take it with you to your doctor appointments.  Keep a list of your current medications including injectables and over the counter medications and bring this medication list with you to all your doctor appointments.  If you have not seen your ophthalmologist this year call for appointment.  Check your feet daily for redness, sores, or openings. Do not self treat. If no improvement in two days call your primary care physician for an appointment.  Low blood sugar (hypoglycemia) is a blood sugar below 70mg/dl. Check your blood sugar if you feel signs/symptoms of hypoglycemia. If your blood sugar is below 70 take 15 grams of carbohydrates (ex 4 oz of apple juice, 3-4 glucose tablets, or 4-6 oz of regular soda) wait 15 minutes and repeat blood sugar to make sure it comes up above 70.  If your blood sugar is above 70 and you are due for a meal, have a meal.  If you are not due for a meal have a snack.  This snack helps keeps your blood sugar at a safe range.  Secondary Diagnosis:	Chronic obstructive pulmonary disease  Goal:	stable  Assessment and plan of treatment:	Call your Health Care provider upon arrival home to make a follow up appointment within one week.  Take all inhalers as prescribed by your Health Care Provider.  Take steroids as prescribed by your Health Care Provider.  If your cough increases infrequency and severity and/or you have shortness of breath or increased shortness of breath call your Health Care Provider.  If you develop fever, chills, night sweats, malaise, and/or change in mental status call your Health care Provider.  Nutrition is very important.  Eat small frequent meals.  Increase your activity as tolerated.  Do not stay in bed all day  Secondary Diagnosis:	Hypertension  Goal:	stable  Assessment and plan of treatment:	Follow up with your medical doctor to establish long term blood pressure treatment goals.

## 2018-06-14 NOTE — DISCHARGE NOTE ADULT - PATIENT PORTAL LINK FT
You can access the [facility] Patient Portal, offered by [facility], by registering with the following website: [email or web address]

## 2018-06-14 NOTE — PROGRESS NOTE ADULT - PROBLEM SELECTOR PLAN 1
No SOB or chest pain but c/o exertional dyspnea. He has been following with Dr Ceron (HF team). The is no evidence of significant CAD progression s/p PCI (LCX is  and there is mild in-stent restenosis of LAD and RCA)  - cardiology recommended medical management at this point.  - continue DAPT (Aspirin/Clopidogrel), Atorvastatin 80 mg PO Daily, Carvedilol 12.5 mg PO Q12H  - He will follow up with his outpatient Cardiologist in 1 - 2 weeks
no chest pain but c/o exertional dyspnea. He has been following with Dr Ceron/Dr. Lashonda Fish (HF team). The is no evidence of significant CAD progression s/p PCI (LCX is  and there is mild in-stent restenosis of LAD and RCA)  - continue DAPT (Aspirin/Clopidogrel), Atorvastatin 80 mg PO Daily, Carvedilol 12.5 mg PO Q12H  - Per cardiology no further need for additional inpatient cardiac testing, should follow up with his outpatient Cardiologist in 1 - 2 weeks

## 2018-06-14 NOTE — PROGRESS NOTE ADULT - ASSESSMENT
79 yo M PMH CHF (EF 25-30% in 10/16) s/p L sided BiV ICD, DM, HTN, CAD s/p 9 stents, CVA, COPD on 2L supplemental O2 at home, HLD transferred from [facility] for concern of NSTEMI with an elevated troponin, EKG changes from prior, and CHF exacerbation. Had cath-on medical management. Has been having diarrhea with weight lostsor months.

## 2018-06-14 NOTE — PROGRESS NOTE ADULT - SUBJECTIVE AND OBJECTIVE BOX
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  Type 2 diabetes mellitus  Hyperlipidemia  Hypertension  Carotid stenosis: Bilateral  s/p left CEA 2013  50-79% of SANDRA  S/P cataract surgery: b/l  S/P carotid endarterectomy: left  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  Basal cell carcinoma of face: &#x27; 2013:  s/p Excision Basal Cancer Nose with MOHS  8/2017 left neck excision with skin graft  Status post angioplasty with stent: PCI x 9, last 11/2015    FAMILY HISTORY:  Family history of heart disease (Sibling)    MEDICATIONS  (STANDING):  aspirin enteric coated 81 milliGRAM(s) Oral daily  atorvastatin 80 milliGRAM(s) Oral at bedtime  carvedilol 12.5 milliGRAM(s) Oral every 12 hours  clopidogrel Tablet 75 milliGRAM(s) Oral daily  furosemide    Tablet 40 milliGRAM(s) Oral daily  heparin  Injectable 5000 Unit(s) SubCutaneous every 8 hours  insulin glargine Injectable (LANTUS) 7 Unit(s) SubCutaneous at bedtime  insulin lispro (HumaLOG) corrective regimen sliding scale   SubCutaneous at bedtime  insulin lispro (HumaLOG) corrective regimen sliding scale   SubCutaneous three times a day before meals  lactobacillus acidophilus 1 Tablet(s) Oral daily  latanoprost 0.005% Ophthalmic Solution 1 Drop(s) Both EYES at bedtime  sacubitril 49 mG/valsartan 51 mG 1 Tablet(s) Oral two times a day  tiotropium 18 MICROgram(s) Capsule 1 Capsule(s) Inhalation daily    MEDICATIONS  (PRN):  loperamide 2 milliGRAM(s) Oral every 3 hours PRN Diarrhea      Vital Signs Last 24 Hrs  T(C): 36.8 (14 Jun 2018 05:40), Max: 36.9 (13 Jun 2018 20:46)  T(F): 98.3 (14 Jun 2018 05:40), Max: 98.4 (13 Jun 2018 20:46)  HR: 68 (14 Jun 2018 05:40) (59 - 68)  BP: 133/79 (14 Jun 2018 05:40) (124/69 - 151/86)  BP(mean): --  RR: 18 (14 Jun 2018 05:40) (18 - 18)  SpO2: 95% (14 Jun 2018 05:40) (95% - 98%)  nc/at  s1s2  cta  soft, nt, nd no guarding or rebound  no c/c/e    CBC Full  -  ( 14 Jun 2018 07:56 )  WBC Count : 10.35 K/uL  Hemoglobin : 13.9 g/dL  Hematocrit : 43.3 %  Platelet Count - Automated : 144 K/uL  Mean Cell Volume : 94.3 fl  Mean Cell Hemoglobin : 30.3 pg  Mean Cell Hemoglobin Concentration : 32.1 gm/dL  Auto Neutrophil # : x  Auto Lymphocyte # : x  Auto Monocyte # : x  Auto Eosinophil # : x  Auto Basophil # : x  Auto Neutrophil % : x  Auto Lymphocyte % : x  Auto Monocyte % : x  Auto Eosinophil % : x  Auto Basophil % : x    06-14    142  |  101  |  23  ----------------------------<  130<H>  3.6   |  30  |  0.86    Ca    8.1<L>      14 Jun 2018 06:25  Phos  2.5     06-13  Mg     2.0     06-13    TPro  5.2<L>  /  Alb  2.6<L>  /  TBili  0.8  /  DBili  x   /  AST  16  /  ALT  19  /  AlkPhos  99  06-13    PT/INR - ( 13 Jun 2018 05:09 )   PT: 11.3 sec;   INR: 1.04 ratio         PTT - ( 13 Jun 2018 05:09 )  PTT:30.0 sec

## 2018-06-14 NOTE — PROGRESS NOTE ADULT - PROBLEM SELECTOR PLAN 4
No chest pain Cath showed no evidence of significant CAD progression s/p PCI (LCX is  and there is mild in-stent restenosis of LAD and RCA).  - cardiology recommended to c/w medical management- ASA, Plavix, Coreg, Statin, Entresto
No chest pain Cath showed no evidence of significant CAD progression s/p PCI (LCX is  and there is mild in-stent restenosis of LAD and RCA).  - cardiology recommended to c/w medical management- ASA, Plavix, Coreg, Statin, Entresto

## 2018-06-14 NOTE — PROGRESS NOTE ADULT - ATTENDING COMMENTS
78 year old man with severe ischemic cardiomyopathy, ICD, transferred from [facility] with acute decompensated heart failure, mildly elevated troponin. Coronary angiography findings reviewed, no further interventions warranted. CHF now improved and on appropriate regimen. Thus continue to optimize and anticipate discharge to care of his usual cardiologist and heart failure specialist without further cardiac testing. However, there is consideration for GI testing for chronic diarrhea. Patient should remain on carvedilol and sacubitral/valsartan without interruption or reduction and is on DAPT which should be continued as well.
Patient is seen and examined with fellow, NP and the CCU house-staff. I agree with the history, physical and the assessment and plan.  p/w NSTEMI with mild CHF   monitor I/O  daily standing weight  c/w IV diuresis  awaiting cardiac cath
Patient with severe ischemic cardiomyopathy presents with shortness of breath, diaphoresis, mild troponin elevation and markedly elevated BNP.  Cath today without new coronary disease (LCX is  and there is mild in-stent restenosis of LAD and RCA).    Given new GI complaints (which could just be heart failure), smoldering troponin in the setting of normal creatinine, will consider amyloid cardiomyopathy in addition to ischemic heart disease.  Will check serum free light chains and technetium pyrophosphate scan.    Continue DAPT, high intensity statin, beta-blocker, Entresto, and loop diuretic.
78 year old man with severe ischemic cardiomyopathy, ICD, transferred from [facility] with acute decompensated heart failure, mildly elevated troponin. Coronary angiography findings reviewed, no further interventions warranted. CHF now improved and on appropriate regimen. Thus continue to optimize and anticipate discharge to care of his usual cardiologist and heart failure specialist without further testing.
Seen, examined on 6/13/18

## 2018-06-14 NOTE — PROGRESS NOTE ADULT - NSHPATTENDINGPLANDISCUSS_GEN_ALL_CORE
To reach Cardiology Attending call during weekdays Spectra [phone] or Fellow [phone].
To reach Cardiology Attending call during weekdays Spectra [phone] or Fellow [phone].
Cardiologist, wife of patient

## 2018-06-14 NOTE — DISCHARGE NOTE ADULT - HOSPITAL COURSE
77 yo M PMH CHF (EF 25-30% in 10/16) s/p L sided BiV ICD, DM, HTN, CAD s/p 9 stents, CVA, COPD on 2L supplemental O2 at home, HLD transferred from [facility] for concern of NSTEMI with an elevated troponin, EKG changes from prior, and CHF exacerbation. You received a cardiac angiogram and medical management is recommended at this point. You were also evaluated by GI for positive stool occult. Ct abd/ pelvis was done which revealed diverticulosis without diverticulitis. You have been cleared from an GI and cardiology perspective  for discharge with close follow up. 77 yo M PMH CHF (EF 25-30% in 10/16) s/p L sided BiV ICD, DM, HTN, CAD s/p 9 stents, CVA, COPD on 2L supplemental O2 at home, HLD transferred from [facility] for concern of NSTEMI with an elevated troponin, EKG changes from prior, and CHF exacerbation. You received a cardiac angiogram and medical management is recommended at this point. You were also evaluated by GI for positive stool occult. Ct abd/ pelvis was done which revealed diverticulosis without diverticulitis. You have been cleared from an GI and cardiology perspective  for discharge with close follow up. Please follow with cardiologist with regards to Brilinta. 79 yo M PMH CHF (EF 25-30% in 10/16) s/p L sided BiV ICD, DM, HTN, CAD s/p 9 stents, CVA, COPD on 2L supplemental O2 at home, HLD transferred from [facility] for concern of NSTEMI with an elevated troponin, EKG changes from prior and CHF exacerbation.   The patient had cardiac angiogram and per recommendation medical management is recommended. GI evaluated for diarrhea and weight loss. Stool positive stool occult. Ct abd/ pelvis was done which revealed diverticulosis without diverticulitis. Hb has been stable. cardiology cleared for discharge and GI planned for outpatient work up. The patient was advised to have outpatient follow up with HF team- Dr brittny Fish for further care.

## 2018-06-14 NOTE — DISCHARGE NOTE ADULT - PLAN OF CARE
stable s/p angiogram - no significant evidence of CAD progression s/p PCI (lcx is  and there is mild in stent restenosis of LAD AND RCA) Weigh yourself daily.  If you gain 3lbs in 3 days, or 5lbs in a week call your Health Care Provider.  Do not eat or drink foods containing more than 2000mg of salt (sodium) in your diet every day.  Call your Health Care Provider if you have any swelling or increased swelling in your feet, ankles, and/or stomach.  Take all of your medication as directed.  If you become dizzy call your Health Care Provider. ct abd/pelvis - negative diverticulosis without diverticulitis. Coronary artery disease is a condition where the arteries the supply the heart muscle get clogges with fatty deposits & puts you at risk for a heart attack  Call your doctor if you have any new pain, pressure, or discomfort in the center of your chest, pain, tingling or discomfort in arms, back, neck, jaw, or stomach, shortness of breath, nausea, vomiting, burping or heartburn, sweating, cold and clammy skin, racing or abnormal heartbeat for more than 10 minutes or if they keep coming & going.  Call 911 and do not tr to get to hospital by care  You can help yourself with lefestyle changes (quitting smoking if you smoke), eat lots of fruits & vegetables & low fat dairy products, not a lot of meat & fatty foods, walk or some form of physical activity most days of the week, lose weight if you are overweight  Take your cardiac medication as prescribed to lower cholesterol, to lower blood pressure, aspirin to prevent blood clots, and diabetes control  Make sure to keep appointments with doctor for cardiac follow up care HgA1C this admission.  Make sure you get your HgA1c checked every three months.  If you take oral diabetes medications, check your blood glucose two times a day.  If you take insulin, check your blood glucose before meals and at bedtime.  It's important not to skip any meals.  Keep a log of your blood glucose results and always take it with you to your doctor appointments.  Keep a list of your current medications including injectables and over the counter medications and bring this medication list with you to all your doctor appointments.  If you have not seen your ophthalmologist this year call for appointment.  Check your feet daily for redness, sores, or openings. Do not self treat. If no improvement in two days call your primary care physician for an appointment.  Low blood sugar (hypoglycemia) is a blood sugar below 70mg/dl. Check your blood sugar if you feel signs/symptoms of hypoglycemia. If your blood sugar is below 70 take 15 grams of carbohydrates (ex 4 oz of apple juice, 3-4 glucose tablets, or 4-6 oz of regular soda) wait 15 minutes and repeat blood sugar to make sure it comes up above 70.  If your blood sugar is above 70 and you are due for a meal, have a meal.  If you are not due for a meal have a snack.  This snack helps keeps your blood sugar at a safe range. Call your Health Care provider upon arrival home to make a follow up appointment within one week.  Take all inhalers as prescribed by your Health Care Provider.  Take steroids as prescribed by your Health Care Provider.  If your cough increases infrequency and severity and/or you have shortness of breath or increased shortness of breath call your Health Care Provider.  If you develop fever, chills, night sweats, malaise, and/or change in mental status call your Health care Provider.  Nutrition is very important.  Eat small frequent meals.  Increase your activity as tolerated.  Do not stay in bed all day Follow up with your medical doctor to establish long term blood pressure treatment goals.

## 2018-06-14 NOTE — DISCHARGE NOTE ADULT - MEDICATION SUMMARY - MEDICATIONS TO STOP TAKING
I will STOP taking the medications listed below when I get home from the hospital:    spironolactone 25 mg oral tablet  -- 1 tab(s) by mouth once a day I will STOP taking the medications listed below when I get home from the hospital:    Effient 10 mg oral tablet  -- 1 tab(s) by mouth once a day    insulin glargine  --   15 units qhs daily    spironolactone 25 mg oral tablet  -- 1 tab(s) by mouth once a day    furosemide 20 mg oral tablet  -- 1 tab(s) by mouth once a day

## 2018-06-14 NOTE — PROGRESS NOTE ADULT - PROBLEM SELECTOR PLAN 2
CXR is clear, No pedal edema  On 5/3/18 Dobutamine SPECT Myocardial Perfusion Imaging: fixed defect: medium sized, moderate inferior and a medium sized, moderate severity posterolateral and a small to medium sized, mild to mod severity apical defect. EF 29%. Regional wall motion was abnormal with a moderate sized area of moderate hypokinesis of lateral, inferior, posterior and apical walls.   - Cardiology reevaluated. Recommended medical management with Lasix, ASA, Plavix, Coreg, Statin, Entresto and follow up with HF team outpatient.  ** spoke to Wife at bedside
CXR is clear, No pedal edema  On 5/3/18 Dobutamine SPECT Myocardial Perfusion Imaging: fixed defect: medium sized, moderate inferior and a medium sized, moderate severity posterolateral and a small to medium sized, mild to mod severity apical defect. EF 29%. Regional wall motion was abnormal with a moderate sized area of moderate hypokinesis of lateral, inferior, posterior and apical walls.   - Cardiology reevaluated. Recommended medical management with Lasix, ASA, Plavix, Coreg, Statin, Entresto and follow up with HF team outpatient.  ** spoke to Wife at bedside

## 2018-06-14 NOTE — PROGRESS NOTE ADULT - SUBJECTIVE AND OBJECTIVE BOX
Patient is a 78y old  Male who presents with a chief complaint of transfer from [facility] for SOB.    SUBJECTIVE / OVERNIGHT EVENTS:  Sitting in bed, no chest pain, SOB chest pain, N/V, No acute Tele event, Has been having diarrhea( non bloody), no abdominal pian      MEDICATIONS  (STANDING):  aspirin enteric coated 81 milliGRAM(s) Oral daily  atorvastatin 80 milliGRAM(s) Oral at bedtime  carvedilol 12.5 milliGRAM(s) Oral every 12 hours  clopidogrel Tablet 75 milliGRAM(s) Oral daily  furosemide    Tablet 40 milliGRAM(s) Oral daily  heparin  Injectable 5000 Unit(s) SubCutaneous every 8 hours  insulin glargine Injectable (LANTUS) 7 Unit(s) SubCutaneous at bedtime  insulin lispro (HumaLOG) corrective regimen sliding scale   SubCutaneous at bedtime  insulin lispro (HumaLOG) corrective regimen sliding scale   SubCutaneous three times a day before meals  lactobacillus acidophilus 1 Tablet(s) Oral daily  latanoprost 0.005% Ophthalmic Solution 1 Drop(s) Both EYES at bedtime  sacubitril 49 mG/valsartan 51 mG 1 Tablet(s) Oral two times a day  tiotropium 18 MICROgram(s) Capsule 1 Capsule(s) Inhalation daily    MEDICATIONS  (PRN):  loperamide 2 milliGRAM(s) Oral every 3 hours PRN Diarrhea      Vital Signs Last 24 Hrs  T(C): 36.8 (14 Jun 2018 05:40), Max: 36.9 (13 Jun 2018 20:46)  T(F): 98.3 (14 Jun 2018 05:40), Max: 98.4 (13 Jun 2018 20:46)  HR: 68 (14 Jun 2018 05:40) (59 - 68)  BP: 133/79 (14 Jun 2018 05:40) (124/69 - 151/86)  BP(mean): --  RR: 18 (14 Jun 2018 05:40) (18 - 18)  SpO2: 95% (14 Jun 2018 05:40) (95% - 98%)  CAPILLARY BLOOD GLUCOSE      POCT Blood Glucose.: 122 mg/dL (14 Jun 2018 07:27)  POCT Blood Glucose.: 228 mg/dL (13 Jun 2018 21:13)  POCT Blood Glucose.: 132 mg/dL (13 Jun 2018 18:01)  POCT Blood Glucose.: 200 mg/dL (13 Jun 2018 13:03)    I&O's Summary    13 Jun 2018 07:01  -  14 Jun 2018 07:00  --------------------------------------------------------  IN: 740 mL / OUT: 1080 mL / NET: -340 mL    14 Jun 2018 07:01  -  14 Jun 2018 10:21  --------------------------------------------------------  IN: 360 mL / OUT: 0 mL / NET: 360 mL        PHYSICAL EXAM:-  GENERAL: NAD, well-developed  EYES: EOMI, PERRLA, conjunctiva and sclera clear  NECK: Supple, No JVD, no thyromegaly  CHEST/LUNG: Clear to auscultation bilaterally; No wheeze  HEART: Regular rate and rhythm; S1, S2 audible, No murmurs, rubs, or gallops  ABDOMEN: Soft, Nontender, Nondistended; Bowel sounds present  EXTREMITIES:  2+ Peripheral Pulses, No clubbing, cyanosis, or edema  NEURO: AAOx3, no focal deficit      LABS:                        13.9   10.35 )-----------( 144      ( 14 Jun 2018 07:56 )             43.3     06-14    142  |  101  |  23  ----------------------------<  130<H>  3.6   |  30  |  0.86    Ca    8.1<L>      14 Jun 2018 06:25  Phos  2.5     06-13  Mg     2.0     06-13    TPro  5.2<L>  /  Alb  2.6<L>  /  TBili  0.8  /  DBili  x   /  AST  16  /  ALT  19  /  AlkPhos  99  06-13    PT/INR - ( 13 Jun 2018 05:09 )   PT: 11.3 sec;   INR: 1.04 ratio         PTT - ( 13 Jun 2018 05:09 )  PTT:30.0 sec      RADIOLOGY & ADDITIONAL TESTS:    Imaging Personally Reviewed: CXR, Cath  Consultant(s) Notes Reviewed:  Cardiology, GI  Care Discussed with Consultants/Other Providers: Cardiology, GI

## 2018-06-15 LAB
CULTURE RESULTS: SIGNIFICANT CHANGE UP
CULTURE RESULTS: SIGNIFICANT CHANGE UP
ENDOMYSIUM IGA TITR SER IF: NEGATIVE — SIGNIFICANT CHANGE UP
ENDOMYSIUM IGA TITR SER: SIGNIFICANT CHANGE UP
KAPPA LC SER QL IFE: 1.68 MG/DL — SIGNIFICANT CHANGE UP (ref 0.33–1.94)
KAPPA/LAMBDA FREE LIGHT CHAIN RATIO, SERUM: 0.94 RATIO — SIGNIFICANT CHANGE UP (ref 0.26–1.65)
LAB BILL ONLY ITEM: SIGNIFICANT CHANGE UP
LAMBDA LC SER QL IFE: 1.79 MG/DL — SIGNIFICANT CHANGE UP (ref 0.57–2.63)
SPECIMEN SOURCE: SIGNIFICANT CHANGE UP
SPECIMEN SOURCE: SIGNIFICANT CHANGE UP

## 2018-06-16 LAB
GLIADIN PEPTIDE IGA SER-ACNC: <5 UNITS — SIGNIFICANT CHANGE UP
GLIADIN PEPTIDE IGA SER-ACNC: NEGATIVE — SIGNIFICANT CHANGE UP
GLIADIN PEPTIDE IGG SER-ACNC: <5 UNITS — SIGNIFICANT CHANGE UP
GLIADIN PEPTIDE IGG SER-ACNC: NEGATIVE — SIGNIFICANT CHANGE UP
TTG IGA SER-ACNC: <5 UNITS — SIGNIFICANT CHANGE UP
TTG IGA SER-ACNC: NEGATIVE — SIGNIFICANT CHANGE UP

## 2018-06-19 LAB — CAROTENE SERPL-MCNC: 9 MCG/DL — SIGNIFICANT CHANGE UP (ref 4–51)

## 2018-06-20 LAB
FAT STL QN: NORMAL — SIGNIFICANT CHANGE UP
FAT STL QN: NORMAL — SIGNIFICANT CHANGE UP

## 2018-06-21 LAB — MENADIONE SERPL-MCNC: 0.13 NG/ML — SIGNIFICANT CHANGE UP (ref 0.13–1.88)

## 2018-06-25 LAB — CALPROTECTIN STL-MCNT: 73 UG/G — SIGNIFICANT CHANGE UP (ref 0–120)

## 2018-06-26 LAB — ELASTASE PANC STL-MCNT: 408 — SIGNIFICANT CHANGE UP

## 2021-08-03 NOTE — PATIENT PROFILE ADULT. - FUNCTIONAL SCREEN CURRENT LEVEL: DRESSING, MLM
Hide Include Location In Plan Question?: No Detail Level: Zone Detail Level: Simple (2) assistive person

## 2022-05-18 NOTE — ASU PATIENT PROFILE, ADULT - AS SC BRADEN MOISTURE
Constitutional: No fever, chills.  Eyes:  No visual changes  ENMT:  No neck pain  Cardiac:  No chest pain  Respiratory:  No cough, SOB  GI:  No nausea, vomiting, diarrhea, abdominal pain.  :  No dysuria, hematuria  MS:  (+)swelling R ankle  Neuro:  No headache or lightheadedness  Skin:  (+)erythema to R ankle  Except as documented in the HPI,  all other systems are negative. (4) rarely moist

## 2022-10-11 NOTE — DISCHARGE NOTE ADULT - DO YOU HAVE DIFFICULTY CLIMBING STAIRS
Patient tolerated injections well, instructed to remain in clinic for 15mins.to monitor for allergic reaction. Verbalized understanding.     No

## 2024-11-20 NOTE — ASU PATIENT PROFILE, ADULT - PMH
Basal cell carcinoma of face  ' 2013:  Nose  CAD (coronary artery disease)  PCI x 9-last 11/2015  Carotid stenosis  Bilateral  s/p left CEA 2013  CHF (congestive heart failure)    Chronic obstructive pulmonary disease    Glaucoma    Hyperlipidemia    Hypertension    Ischemic cardiomyopathy  EF 25-30% (10/2016)  LBBB (left bundle branch block)    Traumatic amputation of fingertip  age 12:  Right 5th Finger  Type 2 diabetes mellitus    Umbilical hernia  assymptomatic Price (Do Not Change): 0.00 Instructions: This plan will send the code FBSE to the PM system.  DO NOT or CHANGE the price. Detail Level: Simple Basal cell carcinoma of face  ' 2013:  Nose  CAD (coronary artery disease)  PCI x 9-last 11/2015  Carotid stenosis  Bilateral  s/p left CEA 2013  50-79% of SANDRA  CHF (congestive heart failure)    Chronic obstructive pulmonary disease    Glaucoma    Hyperlipidemia    Hypertension    Ischemic cardiomyopathy  EF 25-30% (10/2016)  LBBB (left bundle branch block)    Shingles (herpes zoster) polyneuropathy  left axilla  Traumatic amputation of fingertip  age 12:  Right 5th Finger  Type 2 diabetes mellitus    Umbilical hernia  assymptomatic

## 2024-12-24 NOTE — DISCHARGE NOTE ADULT - IF YOU ARE A SMOKER, IT IS IMPORTANT FOR YOUR HEALTH TO STOP SMOKING. PLEASE BE AWARE THAT SECOND HAND SMOKE IS ALSO HARMFUL.
Cough started last night, fever last night. Gtube dependenet- had 2episodes post tussive emesis.Albuterol 4puffs every 4hrs, last one 8pm last night. Motrin last night.    Statement Selected

## 2025-02-10 NOTE — PROGRESS NOTE ADULT - PROBLEM SELECTOR PROBLEM 6
Detail message left on machine informing patient that an Ultrasound and hcg blood work was ordered for patient per jarek due to ongoing bleeding. See uh my chart message.   
Hypertension
Hypertension